# Patient Record
Sex: FEMALE | Race: WHITE
[De-identification: names, ages, dates, MRNs, and addresses within clinical notes are randomized per-mention and may not be internally consistent; named-entity substitution may affect disease eponyms.]

---

## 2017-01-13 ENCOUNTER — HOSPITAL ENCOUNTER (OUTPATIENT)
Dept: HOSPITAL 17 - CPRE | Age: 77
End: 2017-01-13
Attending: ORTHOPAEDIC SURGERY
Payer: MEDICARE

## 2017-01-13 DIAGNOSIS — M79.609: ICD-10-CM

## 2017-01-13 DIAGNOSIS — M17.11: ICD-10-CM

## 2017-01-13 DIAGNOSIS — Z01.812: Primary | ICD-10-CM

## 2017-01-13 DIAGNOSIS — R82.99: ICD-10-CM

## 2017-01-13 DIAGNOSIS — B96.20: ICD-10-CM

## 2017-01-13 LAB
ANION GAP SERPL CALC-SCNC: 10 MEQ/L (ref 5–15)
APTT BLD: 24.5 SEC (ref 24.3–30.1)
BACTERIA #/AREA URNS HPF: (no result) /HPF
BUN SERPL-MCNC: 14 MG/DL (ref 7–18)
CHLORIDE SERPL-SCNC: 103 MEQ/L (ref 98–107)
COLOR UR: YELLOW
COMMENT (UR): (no result)
CULTURE IF INDICATED: (no result)
ERYTHROCYTE [DISTWIDTH] IN BLOOD BY AUTOMATED COUNT: 12.7 % (ref 11.6–17.2)
GFR SERPLBLD BASED ON 1.73 SQ M-ARVRAT: 71 ML/MIN (ref 89–?)
GLUCOSE UR STRIP-MCNC: (no result) MG/DL
HCO3 BLD-SCNC: 29.3 MEQ/L (ref 21–32)
HCT VFR BLD CALC: 45.1 % (ref 35–46)
HGB UR QL STRIP: (no result)
INR PPP: 1 RATIO
KETONES UR STRIP-MCNC: (no result) MG/DL
MCH RBC QN AUTO: 33 PG (ref 27–34)
MCHC RBC AUTO-ENTMCNC: 34.5 % (ref 32–36)
MCV RBC AUTO: 95.7 FL (ref 80–100)
MUCOUS THREADS #/AREA URNS LPF: (no result) /LPF
NITRITE UR QL STRIP: (no result)
PLATELET # BLD: 146 TH/MM3 (ref 150–450)
POTASSIUM SERPL-SCNC: 4.5 MEQ/L (ref 3.5–5.1)
PROTHROMBIN TIME: 11 SEC (ref 9.8–11.6)
RBC # BLD AUTO: 4.71 MIL/MM3 (ref 4–5.3)
REVIEW FLAG: (no result)
SODIUM SERPL-SCNC: 142 MEQ/L (ref 136–145)
SP GR UR STRIP: 1.02 (ref 1–1.03)
SQUAMOUS #/AREA URNS HPF: 1 /HPF (ref 0–5)
WBC # BLD AUTO: 7.1 TH/MM3 (ref 4–11)

## 2017-01-13 PROCEDURE — 87086 URINE CULTURE/COLONY COUNT: CPT

## 2017-01-13 PROCEDURE — 87077 CULTURE AEROBIC IDENTIFY: CPT

## 2017-01-13 PROCEDURE — 80048 BASIC METABOLIC PNL TOTAL CA: CPT

## 2017-01-13 PROCEDURE — 36415 COLL VENOUS BLD VENIPUNCTURE: CPT

## 2017-01-13 PROCEDURE — 85610 PROTHROMBIN TIME: CPT

## 2017-01-13 PROCEDURE — 81001 URINALYSIS AUTO W/SCOPE: CPT

## 2017-01-13 PROCEDURE — 87186 SC STD MICRODIL/AGAR DIL: CPT

## 2017-01-13 PROCEDURE — 85730 THROMBOPLASTIN TIME PARTIAL: CPT

## 2017-01-13 PROCEDURE — 85027 COMPLETE CBC AUTOMATED: CPT

## 2017-02-07 NOTE — RADRPT
EXAM DATE/TIME:  02/07/2017 13:09 

 

[facility] COMPARISON:     

No previous studies available for comparison.

 

                     

INDICATIONS :     

Post op right knee surgery.

                     

 

MEDICAL HISTORY :     

None.          

 

SURGICAL HISTORY :     

None.   

 

ENCOUNTER:     

Initial                                        

 

ACUITY:     

1 day      

 

PAIN SCORE:     

0/10

 

LOCATION:     

Right  Knee.

 

FINDINGS:     

AP and lateral views of the right knee were obtained and demonstrate the patient is status post arthr
oplasty. The femoral and tibial components are intact and in normal alignment. There are postoperativ
e changes involving the patella. A surgical drain is noted in place.

 

CONCLUSION:     Expected post operative changes status post arthroplasty. 

 

 

 Noam Matias MD on February 07, 2017 at 14:35           

Board Certified Radiologist.

 This report was verified electronically.

## 2017-02-07 NOTE — MB
cc:

BAYLEE CUBA MD

****

 

 

DATE OF CONSULTATION

1/7/17

 

DATE OF BIRTH

1940

 

ADMITTING PHYSICIAN

Dr. Ruiz

 

REASON FOR CONSULTATION

Medical management postoperatively.

 

HISTORY OF PRESENT ILLNESS

The patient is a very pleasant 77-year  female with past medical

history of gastroesophageal reflux disease.  The patient is only taking Nexium

at home.  Also, the patient has arthritis for which she is taking care of by

her primary care doctor and orthopedic as an outpatient.  She failed outpatient

therapy.  She was advised to have a total knee arthroplasty.  The patient was

admitted today and had a total knee arthroplasty.  The patient is seen in the

PACU.  At present, in the PACU the patient has no complaint.  She denies any

headache, dizziness, nausea, vomiting, chest pain, diaphoresis or palpitations.

She has no pain, just slight ache at the surgical site.  Her sensation is

back.

 

PAST MEDICAL HISTORY

Gastroesophageal reflux disease.

 

MEDICATIONS

Nexium.

 

ALLERGIES

IODINE

KEFLEX

 

REVIEW OF SYSTEMS

Negative for 10 systems.

 

SOCIAL HISTORY

The patient denies smoking, drinking or doing any  drugs.

 

FAMILY HISTORY

Noncontributory.

 

PHYSICAL EXAMINATION

GENERAL:  The patient is  alert  and oriented lying on bed without any apparent

distress.

VITAL SIGNS:  The patient is afebrile, pulse  is 68, respiratory rate  16,

blood pressure  145/85, pulse of 98% on 2 liters.  HEENT:  Head is

normocephalic.  Negative conjunctival icterus mouth unremarkable.

NECK:  Supple.  Negative increased JVD, negative thyromegaly.  Central trachea.

 

RESPIRATORY:   Clear to auscultation.

CARDIOVASCULAR:  S1, [id number], negative S3 gallop.

GI:  Abdomen soft, no organomegaly.  Positive bowel sounds.  MUSCULOSKELETAL:

Extremities no cyanosis or pedal edema appreciated.

CNS:  Alert and oriented.  Normal facial features, moving bilateral toes.

Moving upper extremities.

 

LABORATORY DATA

No new labs were reviewed.

 

IMAGING STUDIES

X-ray of the knee shows expected postoperative changes status post

arthroplasty.

 

ASSESSMENT

1.  Right total knee arthroplasty due to severe arthritis.

2.  Gastroesophageal reflux disease.

 

RECOMMENDATIONS

1.  Postop antibiotic, analgesics, anticoagulant and PT as per Ortho.

2.  We will continue PPI for her gastroesophageal reflux disease.

3.  Monitor blood pressure.

4.  CBC, BMP in the morning.

5.  Discussed with the patient.

 

Thank you Dr. Ruiz for the consult.  We will follow with you.

 

 

 

                              _________________________________

                              MD NADIRA Gonzales

D:  2/7/2017/4:19 PM

T:  2/7/2017/5:03 PM

Visit #:  [id number]

Job #:  [id number]

## 2017-02-07 NOTE — MP
cc:

PETER ROLAND.

****

 

 

DATE OF SURGERY: 2/7/2017

 

PREOPERATIVE DIAGNOSIS:

Primary osteoarthritis right knee.

 

POSTOPERATIVE DIAGNOSIS

Primary osteoarthritis right knee.

 

OPERATION PERFORMED

Right total knee arthroplasty with Paramjit Triathlon

Prosthesis (uncemented).

 

SURGEON

NARCISO Roland MD

 

ANESTHESIA

Spinal with supplemental adductor canal block and local.

 

INDICATIONS AND FINDINGS

This 77 year-old woman has had right knee pain for the past 18 months.  She did

not respond to arthroscopic surgery and progressively worsened, in spite of the

use of nonsteroidal anti-inflammatories, activity modification, exercise,

intra-articular corticosteroids and ambulatory aids.  The ability to walk was

limited.  She has difficulty ascending and descending stairs and getting out of

a chair.  She has difficulty going to her condo up and down the stairs.

 

PHYSICAL FINDINGS

Physical findings show some genu varum with medial laxity and crepitation on

motion.  There is tenderness in the medial compartment.  She has effusion.

 

RADIOGRAPHIC FINDINGS

Radiographic findings including an x-ray and MRI showed significant arthritis

in the knee with loss of articular cartilage to bone-on-bone on the PA flexion

view and the medial compartments and irregularity in the medial  joint and some

lateral and patellofemoral.

 

OPERATIVE FINDINGS

Operative findings showed significant arthritis in the medial compartment with

exposed subchondral bone on femur and tibia.  In addition there was

irregularity in the lateral compartment and the patellofemoral compartment.

 

The prosthesis used was a Paramjit Triathlon prosthesis.  The femoral component

was a size 3 right cruciate-retaining cementless.  The tibia was a Tritanium

baseplate size 4 with a an 11-mm spacer of X3 polyethylene also

cruciate-retaining.  The patella was a Tritanium backed patella with a size 32

asymmetric.

PROCEDURE

The patient was brought to the operating room and general anesthetic was

administered.  The patient had an adductor canal block preoperatively.  She was

brought to the operating room and had a general anesthetic administered.  She

was placed in a supine position on the operating table with a small bolster

under the right hip.  A pneumatic tourniquet was applied to the right thigh.

The limb was then prepped with alcohol, Hibiclens and Chloraprep, and draped in

the usual manner with the knee draped free.  An appropriate time-out procedure

was carried out.  The wound site was injected after marking with Exparel.  An

anterior incision was then made from about three fingerbreadths above the

superior medial pole of the patella down to the tibial tubercle on the medial

side.  The incision was deepened through the subcutaneous tissues to the

retinacular structures which were exposed medially and laterally.  The medial

retinacular incision was made from the superior medial pole of the patella down

to the tibial tubercle and up into the quadriceps tendon splitting it

longitudinally in the medial one-third.  The patella was reflected.  Medial and

lateral dissection was carried out.  The infrapatellar fat pad was debulked.

The posterior surface of patella was excised with the oscillating saw taking

care to prevent injury to associated structures.  After the patellar protector

was placed, attention was directed the femur.  Oconto's Line was marked.  A

fenestration was made in the distal end of the femur and proximal end of the

tibia for intramedullary referencing guides.  The distal femoral cutting guide

and jig were assembled for 5 degree 8-mm cut.  Cutting block was stabilized

with pins.  The jig was removed.  The distal femoral cut was completed with the

oscillating saw.  A sizing guide was positioned along Oconto's Line and the

epicondylar axis.

 

The size of the femur was determined to be a size 3.  The four-in-one cutting

block was then positioned in place.  Anterior and posterior cuts were made

followed by posterior and anterior chamfer cuts.  Osteophytes were trimmed.

Medial and lateral meniscectomies were completed.  The posterior osteophytes

were trimmed.  The tibia resection guide was positioned in place with the

intramedullary referencing guide.  The alignment was checked and the guide

stabilized with pins.  The depth of cut was then verified with the stylus.  The

cutting block was stabilized with pins.  The jig was removed.  The spacer block

was used to determine the depth of cut.  The proximal tibial cutting guide was

adjusted and stabilized.  The proximal tibial cut was completed with the

oscillating saw.

 

After checking again with the sizing block, it was determined that the

prosthesis would be an 11 mm one.  The tibial baseplate size 4 with an 11 mm

spacer was inserted.  This appeared to be appropriate.  Femoral component was

impacted into place.  The patella drill guide was positioned in place and

patella drill holes made.  The 32 millimeter patella was chosen.  The knee was

taken through a range of motion which was easily 0 degrees extension to 145

degrees of flexion with excellent stability and flexion and extension.

 

The femoral drill holes were made.  The femoral and patella trial components

were removed.  The tibial spacer trial was removed after using the tibial punch

through its guide.  The tibial baseplate trial was removed.  The tibial drill

guide was positioned in place.  Drill holes were made.

 

Exparel was then used throughout the knee, especially in the posterior capsule.

After cleaning the cut ends of bone with pulse lavage, the tibial baseplate was

impacted into place and seated appropriately.  An 11 millimeter spacer was

inserted.  The femoral component was then impacted into place and seated

appropriately.  The patella was then positioned in place and seated

appropriately with the patella device.

 

Drains were brought out the superolateral aspect of the suprapatellar pouch.

Hemostasis was achieved with electrocautery.  Wound closure then commenced

using 0 Vicryl interrupted figure-of-eight sutures for the retinacular and

capsular structures, 2-0 Vicryl interrupted simple sutures with buried knots

for the subcutaneous tissues and 4-0 Monocryl continuous subcuticular closure

for the skin.  The wound was dressed with Steri-Strips followed by dry

dressing, sterile Sof-Rol, cooling pad further sterile Sof-Rol and Ace bandage

from the base of the toe to midthigh.  The patient was transferred from the

operating room to the recovery room in satisfactory condition having tolerated

the procedure well.

 

 

 

 

 

Counts were correct.  Specimens none.  Estimated blood loss 100 mL.

 

 

                              _________________________________

                              MD PETER Wayne/SUZETTE

D:  2/7/2017/12:25 PM

T:  2/7/2017/10:18 PM

Visit #:  [id number]

Job #:  [id number]

## 2017-02-08 NOTE — PD.ORT.PN
Subjective


Post Op Day #:  1


Subjective Remarks


She is doing well. 


There is little pain.


She did not dangle or have PT.





Objective


Vitals





 Vital Signs








  Date Time  Temp Pulse Resp B/P Pulse Ox O2 Delivery O2 Flow Rate FiO2


 


2/8/17 04:31 98.4 69 18 124/68 96   


 


2/8/17 00:24 97.6 78 18 122/75 96   


 


2/7/17 20:52        21


 


2/7/17 17:45 96.0 94 18 124/66 96   


 


2/7/17 17:00  94 16 118/63 95 Room Air  


 


2/7/17 16:15   16  98   


 


2/7/17 16:00  62 16 128/84 97 Nasal Cannula 2 


 


2/7/17 15:00  64 16 123/78 97 Nasal Cannula 2 


 


2/7/17 14:00  68 16 145/85 98 Nasal Cannula 2 


 


2/7/17 13:45  62 16 137/81 97 Nasal Cannula 2 


 


2/7/17 13:30  66 16 146/82 98 Nasal Cannula 2 


 


2/7/17 13:15  62 16 143/77 97 Nasal Cannula 2 


 


2/7/17 13:00  64 16 126/72 95 Nasal Cannula 2 


 


2/7/17 12:45  72 16 118/71 95 Nasal Cannula 2 


 


2/7/17 12:34 97.7 86 16 115/74 93 Nasal Cannula 2 


 


2/7/17 07:28 97.9 98 20 176/91 96   








 I/O








 2/7/17 2/7/17 2/7/17 2/8/17 2/8/17 2/8/17





 07:00 15:00 23:00 07:00 15:00 23:00


 


Intake Total  50 ml 500 ml 600 ml  


 


Output Total  60 ml 1040 ml 50 ml  


 


Balance  -10 ml -540 ml 550 ml  


 


      


 


Intake Oral   500 ml 600 ml  


 


IV Total  50 ml    


 


Output Urine Total   900 ml   


 


Drainage Total  60 ml 140 ml 50 ml  


 


# Voids    5  


 


# Bowel Movements   0 0  








Imaging





Last 24 hours Impressions








Knee X-Ray 2/7/17 1258 Signed





Impressions: 





 Service Date/Time:  Tuesday, February 7, 2017 13:09 - CONCLUSION: Expected 

post 





 operative changes status post arthroplasty.     Noam Matias MD 








Objective Remarks


She is resting comfortably, supine in bed in the [facility].


The neurovascular status is intact.


The original dressing is dry and intact.





Assessment & Plan


Ortho Post Op Day #:  1


Problem List:  


(1) Status post total right knee replacement


Plan:  Continue postop care. Start PT.





Assessment and Plan


Condition: Good.


Orthopaedically stable.


DVT prophylaxis: DOMO stockings, sequentials, early mobilization, ASA.


Discharge plans: Home with [facility], probably tomorrow.


Has appointment.








PETER Ruiz MD (Charles) Feb 8, 2017 06:44

## 2017-02-08 NOTE — HHI.PR
Subjective


Remarks


No chest pain no ,


shortness of breath


Cough, states seasonal


No headache


Appetite good


Afebrile





Objective


Objective Results


-





 Vital Signs








  Date Time  Temp Pulse Resp B/P Pulse Ox O2 Delivery O2 Flow Rate FiO2


 


2/8/17 09:05     93   21


 


2/8/17 04:31 98.4 69 18 124/68 96   


 


2/8/17 00:24 97.6 78 18 122/75 96   


 


2/7/17 20:52        21


 


2/7/17 17:45 96.0 94 18 124/66 96   


 


2/7/17 17:00  94 16 118/63 95 Room Air  


 


2/7/17 16:15   16  98   


 


2/7/17 16:00  62 16 128/84 97 Nasal Cannula 2 


 


2/7/17 15:00  64 16 123/78 97 Nasal Cannula 2 


 


2/7/17 14:00  68 16 145/85 98 Nasal Cannula 2 








 I/O








 2/7/17 2/7/17 2/7/17 2/8/17 2/8/17 2/8/17





 07:00 15:00 23:00 07:00 15:00 23:00


 


Intake Total  50 ml 500 ml 600 ml  


 


Output Total  60 ml 1040 ml 50 ml  


 


Balance  -10 ml -540 ml 550 ml  


 


      


 


Intake Oral   500 ml 600 ml  


 


IV Total  50 ml    


 


Output Urine Total   900 ml   


 


Drainage Total  60 ml 140 ml 50 ml  


 


# Voids    5  


 


# Bowel Movements   0 0  








Result Diagram:  


2/8/17 0732                                                                    

            2/8/17 0732








ROS


General:  Weakness (generalized), Other (10 point ROS complete, mild 

generalized weakness postop otherwise negative exam)





Physical Exam


Physical Exam





PHYSICAL EXAMINATION


GENERAL:  This is a well-developed, well-nourished   female 


who appears to be in no acute distress.  She  is alert and awake, responds to 

verbal stimuli.  


HEAD:  Normocephalic without any lesion or mass noted.  Facial features


appear symmetric.


OROPHARYNGEAL:  Oropharynx without erythema or edema.


NECK:  Supple.  No nuchal rigidity or lymphadenopathy.


Trachea midline without deviation.  


CARDIAC:  Regular rhythm, regular rate, S1 and S2 are heard.  Murmur none; no 

gallops or rubs.


LUNGS:  Clear to auscultation bilaterally.  No wheeze, no rhonchi or no rale.  

No


use of accessory muscles on inspiration or expiration.


ABDOMEN:  Soft, nontender, no organomegaly or masses.  Bowel sounds are


heard in all four quadrants.  No rebound.  No guarding.


EXTREMITIES:  Trace edema right leg.  Wrapped securely with cooling apparatus 

on.  Drainage intimal dark red in container.  Pulses equal bilateral.  No 

cyanosis.


NEUROLOGICAL:  Patient mood and affect appropriate. No focal deficit


SKIN:Warm and moist, dry


Objective Remarks


I Am doing okay I guess. Got up and walked X 1 today.





A/P


Assessment and Plan





1.  Right total knee arthroplasty due to severe arthritis.


2.  Gastroesophageal reflux disease.


3.  Hyperglycemia mild, no known diabetes


 


RECOMMENDATIONS


 Postop antibiotic, analgesics, anticoagulant and PT as per Ortho.


 We will continue PPI for her gastroesophageal reflux disease.


 Monitor blood pressure normal limits, afebrile, rate 60s to 90s, respirations 

18.


 Discussed with the patient medical plan of care and any concerns


Monitor nutrition


Monitor blood sugar, and or any symptoms of hyperglycemia


Discharge Planning


rehab


Discussed With:  Nurse, Family (patient), Other (Dr. Johnson, patient seen on his 

behalf)








Jamila Arzate Feb 8, 2017 13:58





Jamila Arzate Feb 8, 2017 13:58

## 2017-02-09 NOTE — PD.ORT.PN
Subjective


Post Op Day #:  2


Subjective Remarks


She is doing well. 


There is a little more pain than yesterday.


She did well with PT.





She has 13 steps to ascend/descend to access her home.


She lives alone.


Range of Motion


0 to 90 degrees.


Distance Walked


100 feet, then 150 feet.





Objective


Vitals





 Vital Signs








  Date Time  Temp Pulse Resp B/P Pulse Ox O2 Delivery O2 Flow Rate FiO2


 


2/9/17 04:00 97.2 81 20 137/83 95   


 


2/9/17 00:00 97.5 81 20 130/77 93   


 


2/8/17 20:40 97.5 80 15 118/61 95   


 


2/8/17 19:39 97.5 81 13 118/61 95   


 


2/8/17 18:35        21


 


2/8/17 15:45 96.1 75 13 120/63 98   


 


2/8/17 12:00 95.8 87 16 128/71 97   


 


2/8/17 09:05     93   21


 


2/8/17 08:00 95.6 84 16 148/71 95   








 I/O








 2/8/17 2/8/17 2/8/17 2/9/17 2/9/17 2/9/17





 07:00 15:00 23:00 07:00 15:00 23:00


 


Intake Total 600 ml 450 ml 240 ml 220 ml  


 


Output Total 50 ml  220 ml   


 


Balance 550 ml 450 ml 20 ml 220 ml  


 


      


 


Intake Oral 600 ml  240 ml 220 ml  


 


IV Total  450 ml    


 


Drainage Total 50 ml  220 ml   


 


# Voids 5  1 2  


 


# Bowel Movements 0  0 0  





220 drainage recorded was 170+50ml.


Result Diagram:  


2/8/17 0732                                                                    

            2/8/17 0732





Imaging





Last 24 hours Impressions








Knee X-Ray 2/7/17 1258 Signed





Impressions: 





 Service Date/Time:  Tuesday, February 7, 2017 13:09 - CONCLUSION: Expected 

post 





 operative changes status post arthroplasty.     Noam Matias MD 








Objective Remarks


She is resting comfortably, supine in bed in the [facility].


The neurovascular status is intact.


The original dressing is dry and intact.





Assessment & Plan


Ortho Post Op Day #:  2


Problem List:  


(1) Status post total right knee replacement


Plan:  Continue postop care and PT.





Assessment and Plan


Condition: Good.


Orthopaedically stable.


DVT prophylaxis: DOMO stockings, sequentials, early mobilization, Xarelto.


Discharge plans: SNF for rehab, probably tomorrow.


Has appointment.


Rx: Norco 7.5/325.








PETER Ruiz MD ([place]) Feb 9, 2017 06:49

## 2017-02-09 NOTE — HHI.FF
Face to Face Verification


Diagnosis:  


(1) Status post total right knee replacement


Physical Therapy


Gait training


Knee:  Total knee, Protocol: Right, Gait training, Full weight bearing


Right LE Weight Bearing:  WB as tolerated


Right LE Range of Motion:  Active ROM (AROM, AAROM, PROM, PRE. ROM goal is 0 to 

135 degrees. ROM in the OR was 0 to 145 degrees.)





Nursing


Nursing:  Dressing changes


Dressing Changes:  Daily dressing change, Coverderm/Primapore


Additional Instructions


Remove steristrips on postop day 14.








I have seen patient Vicky Martínez on 2/9/17. My clinical findings support 

the need for the requested home health care services because:


Ltd mobility - disease progression


Limited ability to care for self


High risk of falls








I certify that my clinical findings support that this patient is homebound 

because:


Post-op weakness


Unsteady gait/balance


Unsafe to leave home unassisted








PETER Ruiz MD (Charles) Feb 9, 2017 08:08

## 2017-02-09 NOTE — HHI.PR
Subjective


Subjective Remarks


Ambulating today


No chest pain


Shortness of breath


MOM given for bowel regimen


Mild to moderate pain





Review of Systems


Constitutional


Constitutional:  Weakness (mild postop)


Constitutional Remarks


10 point ROS done.  Positives noted some mild generalized weakness





Musculoskeletal


MS:  Stiffness


MS Remarks


Right total knee, dressing clean, to be changed today





Integumentary


Skin:  Wounds (Clean, dressing dry)





Psychiatric


Psychiatric:  Normal Mood





Vitals/Results


Intake & Output











 2/8/17 2/8/17 2/9/17





 15:00 23:00 07:00


 


Intake Total 450 ml 240 ml 220 ml


 


Output Total  220 ml 


 


Balance 450 ml 20 ml 220 ml


 


   


 


Intake Oral  240 ml 220 ml


 


IV Total 450 ml  


 


Drainage Total  220 ml 


 


# Voids  1 2


 


# Bowel Movements  0 0








Vital Signs





 Vital Signs








  Date Time  Temp Pulse Resp B/P Pulse Ox O2 Delivery O2 Flow Rate FiO2


 


2/9/17 11:40 95.4 77 16 97/54 94   


 


2/9/17 08:00 95.6 72 17 138/75 95   


 


2/9/17 04:00 97.2 81 20 137/83 95   


 


2/9/17 00:00 97.5 81 20 130/77 93   


 


2/8/17 20:40 97.5 80 15 118/61 95   


 


2/8/17 19:39 97.5 81 13 118/61 95   


 


2/8/17 18:35        21


 


2/8/17 15:45 96.1 75 13 120/63 98   








CBC/BMP:  


2/9/17 0600                                                                    

            2/8/17 0732





Lab Results





Laboratory Tests








Test 2/9/17





 06:00


 


Hemoglobin 12.0 GM/DL


 


Hematocrit 35.6 %








Imaging Remarks





Last Impressions








Knee X-Ray 2/7/17 1258 Signed





Impressions: 





 Service Date/Time:  Tuesday, February 7, 2017 13:09 - CONCLUSION: Expected 

post 





 operative changes status post arthroplasty.     Noam Matias MD 








Current Medications





 Active Medications


Docusate Sodium (Colace) 100 mg BID PO;  Start 2/8/17 at 21:00





Physical Exam


General


General Appearance:  Well Developed, Well Nourished, No Acute Distress





Eyes


Eye Exam:  Pupils Equal, Pupils Reactive





Ears & Nose


Ears & Nose Exam:  Nasal Mucosa Pink





Throat


Throat Exam:  Oral Mucosa Pink & Moist





Neck


Neck Exam:  Neck Supple, Trachea Midline





Pulmonary


Resp Exam:  Clear Bilaterally, Breath Sounds Equal, No Distress





Cardiology


CV Exam:  Regular





Gastrointestinal/Abdomen


GI Exam:  Bowel Sounds Present


GI Remarks


No BM in 2 days.  MOM taken today





Genitourinary


 Exam:  Clear Urine





Musculoskeletal


MS Exam:  Normal Tone, Good Strength


MS Remarks


Right right knee total, postop day 2





Integumentary


Skin Exam:  Clear, Warm, Dry, Normal Turgor


Skin Remarks


Clean incision line right knee





Extremeties


Extremities Exam:  No Edema, Trace Edema





Neurologic


Neuro Exam:  Alert, Awake, Oriented, Speech Clear, Moving All Extremities





VTE Prophylaxis


VTE Prophylaxis Device:  SCDs





Assessment/Plan


Assessment/Plan


Assessment and Plan





1.  Right total knee arthroplasty due to severe arthritis.


2.  Gastroesophageal reflux disease.


3.  Hyperglycemia mild, no known diabetes


4.  Debility mild, 


5.  Constipation mild





 


RECOMMENDATIONS


 Postop antibiotic, analgesics, anticoagulant and PT as per Ortho.


 We will continue PPI for her gastroesophageal reflux disease.


 Monitor blood pressure normal limits, afebrile, rate 60s to 90s, respirations 

18.


 Discussed with the patient medical plan of care and any concerns


Monitor nutrition, appetite good


Monitor blood sugar, and or any symptoms of hyperglycemia


BMP in a.m.


Monitor bowel regimen.  Took MOM today.  Denies any acute issues for now


Plan to go to rehabilitation after 3 nights stay.


Discharge Planning


rehab


Discussed With:  Nurse, Family (patient), Other (Dr. Johnson, patient seen on his 

behalf)


Discussed Condition with:  Patient








Jamila ArzateLyssa CMGEE Feb 9, 2017 13:22

## 2017-02-10 NOTE — HHI.PR
Subjective


Interval History


Patient is feeling better


Some ache at surgical site


Did walk today


Review of system for 10 point system otherwise unremarkable





Review of Systems


Constitutional


Constitutional:  Weakness (mild postop)





Musculoskeletal


MS:  Stiffness





Integumentary


Skin:  Wounds (Clean, dressing dry)





Psychiatric


Psychiatric:  Normal Mood





Vitals/Results


Intake & Output











 2/9/17 2/9/17 2/10/17





 15:00 23:00 07:00


 


Intake Total 720 ml 240 ml 480 ml


 


Output Total 70 ml  


 


Balance 650 ml 240 ml 480 ml


 


   


 


Intake Oral 720 ml 240 ml 480 ml


 


Drainage Total 70 ml  


 


# Voids 3 2 3


 


# Bowel Movements 1 2 0








Vital Signs





 Vital Signs








  Date Time  Temp Pulse Resp B/P Pulse Ox O2 Delivery O2 Flow Rate FiO2


 


2/10/17 08:12 98.6 80 16 138/67 94   


 


2/10/17 00:00 99.4 86 16 121/62 94   


 


2/9/17 21:08        21


 


2/9/17 20:30 98.6 84 16 109/54 95   


 


2/9/17 19:05  89 20 101/54 97   


 


2/9/17 16:05 97.5 82 17 101/76 96   


 


2/9/17 11:40 95.4 77 16 97/54 94   








CBC/BMP:  


2/9/17 0600                                                                    

            2/10/17 0600





Lab Results





Laboratory Tests








Test 2/10/17





 06:00


 


Sodium Level 141 MEQ/L


 


Potassium Level 3.5 MEQ/L


 


Chloride Level 104 MEQ/L


 


Carbon Dioxide Level 27.0 MEQ/L


 


Anion Gap 10 MEQ/L


 


Blood Urea Nitrogen 9 MG/DL


 


Creatinine 0.53 MG/DL


 


Estimat Glomerular Filtration 112 ML/MIN





Rate 


 


Random Glucose 102 MG/DL


 


Calcium Level 8.5 MG/DL











Physical Exam


General


General Appearance:  Well Developed, Well Nourished, No Acute Distress





Eyes


Eye Exam:  Pupils Equal, Pupils Reactive





Ears & Nose


Ears & Nose Exam:  Nasal Mucosa Pink





Throat


Throat Exam:  Oral Mucosa Pink & Moist





Neck


Neck Exam:  Neck Supple, Trachea Midline





Pulmonary


Resp Exam:  Clear Bilaterally, Breath Sounds Equal, No Distress





Cardiology


CV Exam:  Regular





Gastrointestinal/Abdomen


GI Exam:  Bowel Sounds Present





Genitourinary


 Exam:  Clear Urine





Musculoskeletal


MS Exam:  Normal Tone, Good Strength


MS Remarks


Dressing on surgical area





Integumentary


Skin Exam:  Clear, Warm, Dry, Normal Turgor





Extremeties


Extremities Exam:  No Edema, Trace Edema





Neurologic


Neuro Exam:  Alert, Awake, Oriented, Speech Clear, Moving All Extremities





VTE Prophylaxis


VTE Prophylaxis Device:  SCDs


VTE Remarks


Xarelto





Assessment/Plan


Assessment/Plan


Assessment and Plan





1.  Right total knee arthroplasty due to severe arthritis.


2.  Gastroesophageal reflux disease.


3.  Hyperglycemia mild, no known diabetes


4.  Debility mild, 


5.  Constipation mild





 


RECOMMENDATIONS


 Postop antibiotic, analgesics, anticoagulant and PT as per Ortho.


 continue PPI for her gastroesophageal reflux disease.


Stable blood pressure


Labs reviewed


 Discussed with the patient medical plan of care and any concerns


Monitor nutrition, appetite good


Monitor bowel regimen.  Denies any acute issues for now


Plan to go to rehabilitation hopefully today.











Carla Johnson MD Feb 10, 2017 11:02

## 2017-02-10 NOTE — PD.ORT.PN
Subjective


Post Op Day #:  3


Subjective Remarks


She is doing well. 


There is less pain than yesterday.


She has done well with PT.





She has 13 steps to ascend/descend to access her home.


She lives alone.


Range of Motion


-4 to 96 degrees.


Distance Walked


200 feet.





Objective


Vitals





 Vital Signs








  Date Time  Temp Pulse Resp B/P Pulse Ox O2 Delivery O2 Flow Rate FiO2


 


2/10/17 00:00 99.4 86 16 121/62 94   


 


2/9/17 21:08        21


 


2/9/17 20:30 98.6 84 16 109/54 95   


 


2/9/17 19:05  89 20 101/54 97   


 


2/9/17 16:05 97.5 82 17 101/76 96   


 


2/9/17 11:40 95.4 77 16 97/54 94   








 I/O








 2/9/17 2/9/17 2/9/17 2/10/17 2/10/17 2/10/17





 07:00 15:00 23:00 07:00 15:00 23:00


 


Intake Total 220 ml 720 ml 240 ml 480 ml  


 


Output Total 40 ml 70 ml    


 


Balance 180 ml 650 ml 240 ml 480 ml  


 


      


 


Intake Oral 220 ml 720 ml 240 ml 480 ml  


 


Drainage Total 40 ml 70 ml    


 


# Voids 2 3 2 3  


 


# Bowel Movements 0 1 2 0  








Result Diagram:  


2/9/17 0600                                                                    

            2/10/17 0600





Imaging





Last 24 hours Impressions








Knee X-Ray 2/7/17 1258 Signed





Impressions: 





 Service Date/Time:  Tuesday, February 7, 2017 13:09 - CONCLUSION: Expected 

post 





 operative changes status post arthroplasty.     Noam Matias MD 








Objective Remarks


She is resting comfortably, supine in bed in the C[facility].


The neurovascular status is intact.


The dressing is dry and intact.


There is no erythema or induration.





Assessment & Plan


Ortho Post Op Day #:  3


Problem List:  


(1) Status post total right knee replacement


Plan:  Continue postop care and PT.





Assessment and Plan


Condition: Good.


Orthopaedically stable.


DVT prophylaxis: DOMO stockings, sequentials, early mobilization, Xarelto.


Discharge plans: SNF for rehab today.


Has appointment.


Rx: Norco 7.5/325.








PETER Ruiz MD (Charles) Feb 10, 2017 08:45

## 2017-03-29 NOTE — MD
cc:

PETER ROLAND.

****

 

 

 

ADMISSION DATE: 2/7/2017     DISCHARGE DATE:  2/10/2017

 

ADMISSION DIAGNOSIS

Primary osteoarthritis right knee

 

FINAL DIAGNOSIS

Primary osteoarthritis right knee

 

OPERATION PERFORMED

Right total knee arthroplasty using a Paramjit Triathlon prosthesis

(uncemented).

 

PRESENT ILLNESS

This 77-year-old woman has had 18 months of right knee pain that did not

respond to conservative measures and arthroscopic surgery.  She has used

anti-inflammatory agents, activity modification, exercises, intra-articular

steroids and ambulatory aids.  She has difficulty ascending and descending

stairs and getting out of a chair.  She has had difficulty ambulating.

 

Physical findings showed genu varum with medial laxity and crepitation on

motion.  There is tenderness in the medial compartment.  She had an effusion.

 

 

Radiographic findings showed changes consistent with loss of articular

cartilage to bone-on-bone in the medial compartment with irregularity in the

joint line and patellofemoral and lateral findings as well.

 

HOSPITAL COURSE

The patient was admitted on 02/07/2017.  She had the above-noted operative

procedure carried out after having an abductor canal block preoperatively.  She

tolerated the procedure well.  In  post anesthesia, she was started on a

continuous passive motion device which was used throughout the procedure while

in bed.  She did not start therapy the day of surgery.  She remained afebrile.

There was very little pain.  On the day after surgery, she was started on

physical therapy.  DVT prophylaxis was initiated with DOMO stockings, sequential

and aspirin.

 

 

 

 

By the second postoperative day, she had been doing relatively well with

therapy.  She was able to walk 100 feet and then 150 feet.  Her range of motion

was 0 degrees extension to 90 degrees of flexion.  She did have difficulty With

home and consideration was for appropriate discharge plans since she had 13

steps to ascend and descend to access her home and lives alone.  She remained

afebrile.  Her hemoglobin was 13.1.  She continued to progress well.  DVT

prophylaxis was initiated with DOMO stockings and Xarelto.  Arrangements were

being made for discharge to a skilled nursing facility.

 

On the third postoperative day, she was continuing to make progress.  She

walked 200 feet.  Her range of motion was minus 4 degrees extension to 96

degrees of flexion.  She remained afebrile.  She was discharged to a skilled

nursing facility for rehabilitation on that date.  She will continue with

Xarelto as well as the Norco.  She has a follow-up appointment.

 

 

 

 

                              _________________________________

                              MD PETER Wayne/JONNA

D:  3/22/2017/6:29 PM

T:  3/29/2017/7:44 AM

Visit #:  [id number]

Job #:  [id number]

## 2018-02-20 ENCOUNTER — HOSPITAL ENCOUNTER (INPATIENT)
Dept: HOSPITAL 17 - PHED | Age: 78
LOS: 7 days | Discharge: HOME | DRG: 439 | End: 2018-02-27
Attending: HOSPITALIST | Admitting: HOSPITALIST
Payer: MEDICARE

## 2018-02-20 VITALS
TEMPERATURE: 97.4 F | HEART RATE: 89 BPM | OXYGEN SATURATION: 96 % | RESPIRATION RATE: 19 BRPM | DIASTOLIC BLOOD PRESSURE: 74 MMHG | SYSTOLIC BLOOD PRESSURE: 131 MMHG

## 2018-02-20 VITALS
DIASTOLIC BLOOD PRESSURE: 70 MMHG | OXYGEN SATURATION: 94 % | RESPIRATION RATE: 16 BRPM | HEART RATE: 92 BPM | SYSTOLIC BLOOD PRESSURE: 135 MMHG

## 2018-02-20 VITALS
HEART RATE: 88 BPM | OXYGEN SATURATION: 95 % | DIASTOLIC BLOOD PRESSURE: 74 MMHG | SYSTOLIC BLOOD PRESSURE: 161 MMHG | RESPIRATION RATE: 18 BRPM | TEMPERATURE: 96.6 F

## 2018-02-20 VITALS
OXYGEN SATURATION: 95 % | HEART RATE: 89 BPM | RESPIRATION RATE: 18 BRPM | DIASTOLIC BLOOD PRESSURE: 71 MMHG | SYSTOLIC BLOOD PRESSURE: 137 MMHG

## 2018-02-20 VITALS
RESPIRATION RATE: 16 BRPM | OXYGEN SATURATION: 97 % | DIASTOLIC BLOOD PRESSURE: 67 MMHG | SYSTOLIC BLOOD PRESSURE: 140 MMHG | HEART RATE: 82 BPM

## 2018-02-20 VITALS — BODY MASS INDEX: 28.48 KG/M2 | HEIGHT: 60 IN | WEIGHT: 145.06 LBS

## 2018-02-20 DIAGNOSIS — N83.202: ICD-10-CM

## 2018-02-20 DIAGNOSIS — G62.9: ICD-10-CM

## 2018-02-20 DIAGNOSIS — Z96.651: ICD-10-CM

## 2018-02-20 DIAGNOSIS — K76.0: ICD-10-CM

## 2018-02-20 DIAGNOSIS — Z88.1: ICD-10-CM

## 2018-02-20 DIAGNOSIS — N39.0: ICD-10-CM

## 2018-02-20 DIAGNOSIS — K86.0: ICD-10-CM

## 2018-02-20 DIAGNOSIS — E87.6: ICD-10-CM

## 2018-02-20 DIAGNOSIS — K21.9: ICD-10-CM

## 2018-02-20 DIAGNOSIS — B96.20: ICD-10-CM

## 2018-02-20 DIAGNOSIS — E87.5: ICD-10-CM

## 2018-02-20 DIAGNOSIS — Z72.89: ICD-10-CM

## 2018-02-20 DIAGNOSIS — M19.90: ICD-10-CM

## 2018-02-20 DIAGNOSIS — K85.20: ICD-10-CM

## 2018-02-20 DIAGNOSIS — K85.90: Primary | ICD-10-CM

## 2018-02-20 DIAGNOSIS — K70.10: ICD-10-CM

## 2018-02-20 LAB
ALBUMIN SERPL-MCNC: 3.4 GM/DL (ref 3.4–5)
ALP SERPL-CCNC: 199 U/L (ref 45–117)
ALT SERPL-CCNC: 90 U/L (ref 10–53)
AST SERPL-CCNC: 304 U/L (ref 15–37)
BASOPHILS # BLD AUTO: 0.1 TH/MM3 (ref 0–0.2)
BASOPHILS NFR BLD: 0.8 % (ref 0–2)
BILIRUB SERPL-MCNC: 2 MG/DL (ref 0.2–1)
BUN SERPL-MCNC: 7 MG/DL (ref 7–18)
CALCIUM SERPL-MCNC: 8.7 MG/DL (ref 8.5–10.1)
CHLORIDE SERPL-SCNC: 102 MEQ/L (ref 98–107)
CREAT SERPL-MCNC: 0.7 MG/DL (ref 0.5–1)
EOSINOPHIL # BLD: 0 TH/MM3 (ref 0–0.4)
EOSINOPHIL NFR BLD: 0.1 % (ref 0–4)
ERYTHROCYTE [DISTWIDTH] IN BLOOD BY AUTOMATED COUNT: 14.4 % (ref 11.6–17.2)
GFR SERPLBLD BASED ON 1.73 SQ M-ARVRAT: 81 ML/MIN (ref 89–?)
GLUCOSE SERPL-MCNC: 102 MG/DL (ref 74–106)
HCO3 BLD-SCNC: 11.5 MEQ/L (ref 21–32)
HCT VFR BLD CALC: 46.9 % (ref 35–46)
HGB BLD-MCNC: 15.1 GM/DL (ref 11.6–15.3)
INR PPP: 1.2 RATIO
LYMPHOCYTES # BLD AUTO: 0.9 TH/MM3 (ref 1–4.8)
LYMPHOCYTES NFR BLD AUTO: 13.1 % (ref 9–44)
MCH RBC QN AUTO: 33.1 PG (ref 27–34)
MCHC RBC AUTO-ENTMCNC: 32.2 % (ref 32–36)
MCV RBC AUTO: 102.6 FL (ref 80–100)
MONOCYTE #: 0.2 TH/MM3 (ref 0–0.9)
MONOCYTES NFR BLD: 2.4 % (ref 0–8)
NEUTROPHILS # BLD AUTO: 5.3 TH/MM3 (ref 1.8–7.7)
NEUTROPHILS NFR BLD AUTO: 83.6 % (ref 16–70)
PLATELET # BLD: 161 TH/MM3 (ref 150–450)
PMV BLD AUTO: 8.4 FL (ref 7–11)
PROT SERPL-MCNC: 8.5 GM/DL (ref 6.4–8.2)
PROTHROMBIN TIME: 11.8 SEC (ref 9.8–11.6)
RBC # BLD AUTO: 4.57 MIL/MM3 (ref 4–5.3)
SODIUM SERPL-SCNC: 134 MEQ/L (ref 136–145)
WBC # BLD AUTO: 6.5 TH/MM3 (ref 4–11)

## 2018-02-20 PROCEDURE — 85025 COMPLETE CBC W/AUTO DIFF WBC: CPT

## 2018-02-20 PROCEDURE — 80061 LIPID PANEL: CPT

## 2018-02-20 PROCEDURE — C9113 INJ PANTOPRAZOLE SODIUM, VIA: HCPCS

## 2018-02-20 PROCEDURE — 83520 IMMUNOASSAY QUANT NOS NONAB: CPT

## 2018-02-20 PROCEDURE — 74181 MRI ABDOMEN W/O CONTRAST: CPT

## 2018-02-20 PROCEDURE — 76937 US GUIDE VASCULAR ACCESS: CPT

## 2018-02-20 PROCEDURE — 83735 ASSAY OF MAGNESIUM: CPT

## 2018-02-20 PROCEDURE — 80307 DRUG TEST PRSMV CHEM ANLYZR: CPT

## 2018-02-20 PROCEDURE — 80048 BASIC METABOLIC PNL TOTAL CA: CPT

## 2018-02-20 PROCEDURE — 81001 URINALYSIS AUTO W/SCOPE: CPT

## 2018-02-20 PROCEDURE — 74176 CT ABD & PELVIS W/O CONTRAST: CPT

## 2018-02-20 PROCEDURE — 87186 SC STD MICRODIL/AGAR DIL: CPT

## 2018-02-20 PROCEDURE — 78226 HEPATOBILIARY SYSTEM IMAGING: CPT

## 2018-02-20 PROCEDURE — 76377 3D RENDER W/INTRP POSTPROCES: CPT

## 2018-02-20 PROCEDURE — 84132 ASSAY OF SERUM POTASSIUM: CPT

## 2018-02-20 PROCEDURE — A9537 TC99M MEBROFENIN: HCPCS

## 2018-02-20 PROCEDURE — 85730 THROMBOPLASTIN TIME PARTIAL: CPT

## 2018-02-20 PROCEDURE — 80074 ACUTE HEPATITIS PANEL: CPT

## 2018-02-20 PROCEDURE — 93005 ELECTROCARDIOGRAM TRACING: CPT

## 2018-02-20 PROCEDURE — 80076 HEPATIC FUNCTION PANEL: CPT

## 2018-02-20 PROCEDURE — 82787 IGG 1 2 3 OR 4 EACH: CPT

## 2018-02-20 PROCEDURE — 80053 COMPREHEN METABOLIC PANEL: CPT

## 2018-02-20 PROCEDURE — 86255 FLUORESCENT ANTIBODY SCREEN: CPT

## 2018-02-20 PROCEDURE — 83690 ASSAY OF LIPASE: CPT

## 2018-02-20 PROCEDURE — 86301 IMMUNOASSAY TUMOR CA 19-9: CPT

## 2018-02-20 PROCEDURE — 82784 ASSAY IGA/IGD/IGG/IGM EACH: CPT

## 2018-02-20 PROCEDURE — 96374 THER/PROPH/DIAG INJ IV PUSH: CPT

## 2018-02-20 PROCEDURE — 87077 CULTURE AEROBIC IDENTIFY: CPT

## 2018-02-20 PROCEDURE — 87086 URINE CULTURE/COLONY COUNT: CPT

## 2018-02-20 PROCEDURE — 85610 PROTHROMBIN TIME: CPT

## 2018-02-20 RX ADMIN — PANTOPRAZOLE SCH MG: 40 TABLET, DELAYED RELEASE ORAL at 20:00

## 2018-02-20 RX ADMIN — ENOXAPARIN SODIUM SCH MG: 40 INJECTION SUBCUTANEOUS at 20:01

## 2018-02-20 RX ADMIN — Medication SCH ML: at 22:39

## 2018-02-20 NOTE — RADRPT
EXAM DATE/TIME:  2018 17:07 

 

[facility] COMPARISON:     

No previous studies available for comparison.

 

 

INDICATIONS :     

Vomiting, and left sided abdominal pain.

                  

 

ORAL CONTRAST:      

No oral contrast ingested.

                  

 

RADIATION DOSE:     

12.84 CTDIvol (mGy) 

 

 

MEDICAL HISTORY :     

Gastroesophageal reflux disease.  Hiatal hernia. Skin cancer.

 

SURGICAL HISTORY :      

Cholecystectomy. Hysterectomy. section.

 

ENCOUNTER:      

Initial

 

ACUITY:      

3 days

 

PAIN SCALE:      

0/10

 

LOCATION:       

Left pelvis abdomen

 

TECHNIQUE:     

Volumetric scanning of the abdomen and pelvis was performed.  Using automated exposure control and ad
justment of the mA and/or kV according to patient size, radiation dose was kept as low as reasonably 
achievable to obtain optimal diagnostic quality images.  DICOM format image data is available electro
nically for review and comparison.  

 

FINDINGS:     

 

LOWER LUNGS:     

The visualized lower lungs are clear.  Moderate-sized hiatus hernia containing the fundus of the stom
ach.

 

LIVER:     

Diffuse fatty change throughout the liver.  No biliary ductal dilatation.  Hemoclips in the tracy fro
m prior cholecystectomy.

 

SPLEEN:     

Normal size without lesion.

 

PANCREAS:     

There is induration of the fat about the pancreas and in the anterior pararenal space.  No focal flui
d collections seen.  No focal cystic areas in the pancreas.  The pancreatic duct is not identified.  
There is poor delineation of the margins of the head and distal body stop

 

KIDNEYS:     

Normal in size and shape.  There is no mass, stone, or hydronephrosis.

 

ADRENAL GLANDS:     

Within normal limits.

 

VASCULAR:     

There is no aortic aneurysm.

 

BOWEL/MESENTERY:     

No dilated loops of small large bowel.  Diverticula are seen scattered throughout the entire colon wi
th greatest concentration in the sigmoid colon; no radiographic evidence of diverticulitis.

 

ABDOMINAL WALL:     

Within normal limits.

 

RETROPERITONEUM:     

There is no lymphadenopathy.

 

BLADDER:     

No wall thickening or mass.

 

REPRODUCTIVE:     

Hysterectomy.  In the left adnexal region, there is a 3.7 x 2.3 cm smooth margin oval low dense area 
with mean CT density 7 Hounsfield units.  This could be ovarian in origin.

 

INGUINAL:     

There is no lymphadenopathy or hernia.

 

MUSCULOSKELETAL:     

Within normal limits for patient age.

 

CONCLUSION:     

1. Abnormal appearance the peripancreatic tissues with induration of the fat and poor delineation of 
the head and body.  The findings suggest pancreatitis.  No focal cystic areas seen and no calcificati
ons.

2. Diffuse fatty change in the liver.

3. Diverticulosis of the colon without radiographic evidence of diverticulitis.

4. 3.7 cm low-density lesion in the left adnexal region of uncertain significance.  This could be rel
ated to the ovary.

 

 

 

 Saeid Singh MD on 2018 at 17:47           

Board Certified Radiologist.

 This report was verified electronically.

## 2018-02-21 VITALS
TEMPERATURE: 96.8 F | DIASTOLIC BLOOD PRESSURE: 82 MMHG | OXYGEN SATURATION: 97 % | HEART RATE: 96 BPM | RESPIRATION RATE: 16 BRPM | SYSTOLIC BLOOD PRESSURE: 150 MMHG

## 2018-02-21 VITALS
RESPIRATION RATE: 16 BRPM | HEART RATE: 98 BPM | DIASTOLIC BLOOD PRESSURE: 63 MMHG | OXYGEN SATURATION: 97 % | SYSTOLIC BLOOD PRESSURE: 127 MMHG | TEMPERATURE: 97.5 F

## 2018-02-21 VITALS
RESPIRATION RATE: 18 BRPM | HEART RATE: 91 BPM | DIASTOLIC BLOOD PRESSURE: 84 MMHG | OXYGEN SATURATION: 97 % | TEMPERATURE: 98 F | SYSTOLIC BLOOD PRESSURE: 119 MMHG

## 2018-02-21 VITALS
DIASTOLIC BLOOD PRESSURE: 63 MMHG | OXYGEN SATURATION: 96 % | RESPIRATION RATE: 20 BRPM | SYSTOLIC BLOOD PRESSURE: 136 MMHG | TEMPERATURE: 98.6 F | HEART RATE: 89 BPM

## 2018-02-21 LAB
BACTERIA #/AREA URNS HPF: (no result) /HPF
BASOPHILS # BLD AUTO: 0 TH/MM3 (ref 0–0.2)
BASOPHILS NFR BLD: 0.4 % (ref 0–2)
BUN SERPL-MCNC: 9 MG/DL (ref 7–18)
CALCIUM SERPL-MCNC: 8.6 MG/DL (ref 8.5–10.1)
CHLORIDE SERPL-SCNC: 106 MEQ/L (ref 98–107)
COLOR UR: YELLOW
CREAT SERPL-MCNC: 0.75 MG/DL (ref 0.5–1)
EOSINOPHIL # BLD: 0 TH/MM3 (ref 0–0.4)
EOSINOPHIL NFR BLD: 0 % (ref 0–4)
ERYTHROCYTE [DISTWIDTH] IN BLOOD BY AUTOMATED COUNT: 15.1 % (ref 11.6–17.2)
GFR SERPLBLD BASED ON 1.73 SQ M-ARVRAT: 75 ML/MIN (ref 89–?)
GLUCOSE SERPL-MCNC: 81 MG/DL (ref 74–106)
GLUCOSE UR STRIP-MCNC: (no result) MG/DL
HCO3 BLD-SCNC: 9.7 MEQ/L (ref 21–32)
HCT VFR BLD CALC: 42.5 % (ref 35–46)
HGB BLD-MCNC: 14.3 GM/DL (ref 11.6–15.3)
HGB UR QL STRIP: (no result)
KETONES UR STRIP-MCNC: (no result) MG/DL
LYMPHOCYTES # BLD AUTO: 1.7 TH/MM3 (ref 1–4.8)
LYMPHOCYTES NFR BLD AUTO: 17.5 % (ref 9–44)
MCH RBC QN AUTO: 34.3 PG (ref 27–34)
MCHC RBC AUTO-ENTMCNC: 33.6 % (ref 32–36)
MCV RBC AUTO: 102.2 FL (ref 80–100)
MONOCYTE #: 0.5 TH/MM3 (ref 0–0.9)
MONOCYTES NFR BLD: 4.8 % (ref 0–8)
MUCOUS THREADS #/AREA URNS LPF: (no result) /LPF
NEUTROPHILS # BLD AUTO: 7.5 TH/MM3 (ref 1.8–7.7)
NEUTROPHILS NFR BLD AUTO: 77.3 % (ref 16–70)
NITRITE UR QL STRIP: (no result)
PLATELET # BLD: 162 TH/MM3 (ref 150–450)
PMV BLD AUTO: 8.4 FL (ref 7–11)
RBC # BLD AUTO: 4.16 MIL/MM3 (ref 4–5.3)
SODIUM SERPL-SCNC: 134 MEQ/L (ref 136–145)
SP GR UR STRIP: 1.02 (ref 1–1.03)
SQUAMOUS #/AREA URNS HPF: (no result) /HPF (ref 0–5)
URINE LEUKOCYTE ESTERASE: (no result)
WBC # BLD AUTO: 9.7 TH/MM3 (ref 4–11)

## 2018-02-21 RX ADMIN — PANTOPRAZOLE SCH MG: 40 TABLET, DELAYED RELEASE ORAL at 08:21

## 2018-02-21 RX ADMIN — PHENYTOIN SODIUM SCH MLS/HR: 50 INJECTION INTRAMUSCULAR; INTRAVENOUS at 12:30

## 2018-02-21 RX ADMIN — ONDANSETRON PRN MG: 2 INJECTION, SOLUTION INTRAMUSCULAR; INTRAVENOUS at 08:21

## 2018-02-21 RX ADMIN — Medication SCH ML: at 20:46

## 2018-02-21 RX ADMIN — Medication SCH ML: at 08:21

## 2018-02-21 RX ADMIN — PHENYTOIN SODIUM SCH MLS/HR: 50 INJECTION INTRAMUSCULAR; INTRAVENOUS at 20:46

## 2018-02-21 RX ADMIN — ENOXAPARIN SODIUM SCH MG: 40 INJECTION SUBCUTANEOUS at 20:46

## 2018-02-21 NOTE — PD.CONS
HPI


History of Present Illness


This is a 78 year old female who was admitted to the hospital today with 

symptoms of upper abdominal pain associated with nausea and vomiting since the 

last 4 days.  She denied any history of heartburn dysphagia constipation 

diarrhea hematemesis melena hematochezia jaundice pruritus ascites edema 

anorexia or weight loss.





PFSH


Past Medical History


She gives history chronic osteoarthritis.


Past Surgical History


c/s x3, BEN


Appy


Breast Bx


R Knee replacement


Coded Allergies:  


     iodine (Unverified  Adverse Reaction, Severe, FLUSH, 18)


 13  DENIES ALLERGY


     potassium iodide (Unverified  Adverse Reaction, Severe, FLUSH, 18)


 13  DENIES ALLERGY


     povidone-iodine (Unverified  Adverse Reaction, Severe, FLUSH, 18)


 13  DENIES ALLERGY


     sodium iodide (Unverified  Adverse Reaction, Severe, FLUSH, 18)


 13  DENIES ALLERGY


     sodium iodide (Unverified  Adverse Reaction, Severe, FLUSH, 18)


 13  DENIES ALLERGY


     cephalexin (Unverified  Adverse Reaction, Intermediate, NAUSEA, 18)


Medications


Motrin, Nexium


Family History


mother had a Brain aneurysm


Father had parkinson's and CHF


Brother with Parkinson's and CHF


Sister  at 43 with a CVA


No history of colorectal cancer, polyps, pancreatic diseases in the family


Social History


No tobacco


She gives history of consuming alcohol on a regular basis.  On the average she 

drinks 4-5 times a week


 one dui 8 years ago


 (  of pancreatic CA)


golfs 3x week





Review of Systems


Gastrointestinal:  COMPLAINS OF: Abdominal pain, Nausea, Vomiting, DENIES: 

Black stools, Bloody stools, Constipation, Diarrhea, Difficulty Swallowing, 

Anorexia, Odynophagia, Swelling of Abdomen, Heartburn, Hematemesis





GI Exam


Vitals I&O





Vital Signs








  Date Time  Temp Pulse Resp B/P (MAP) Pulse Ox O2 Delivery O2 Flow Rate FiO2


 


18 17:12 98.0 91 18 119/84 (96) 97   


 


18 09:13 96.8 96 16 150/82 (104) 97   


 


18 00:00 97.5 98 16 127/63 (84) 97   


 


18 21:15 96.6 88 18 161/74 (103) 95   


 


18 20:54        


 


18 20:48  89 18 137/71 (93) 95 Room Air  














I/O      


 


 18





 07:00 15:00 23:00 07:00 15:00 23:00


 


Intake Total    240 ml  400 ml


 


Balance    240 ml  400 ml


 


      


 


Intake Oral    240 ml  400 ml


 


# Voids   1 3  3


 


# Bowel Movements   1 0  








Imaging


CT scan of the abdomen revealed abnormal pancreas and peripancreatic tissue.  

Scan detected induration of fat and poor delineation of the head and body of 

the pancreas.


MRCP showed no evidence of intra-or extra hepatic bile duct dilation


Laboratory











Test


  18


20:40 18


01:16 18


04:49


 


Potassium Level 4.2 MEQ/L   5.0 MEQ/L 


 


Ethyl Alcohol Level


  LESS THAN 3


MG/DL 


  


 


 


Urine Color  YELLOW  


 


Urine Turbidity  CLEAR  


 


Urine pH  6.0  


 


Urine Specific Gravity  1.018  


 


Urine Protein  100 mg/dL  


 


Urine Glucose (UA)  NEG mg/dL  


 


Urine Ketones


  


  80 OR GREATER


mg/dL 


 


 


Urine Occult Blood  MOD  


 


Urine Nitrite  NEG  


 


Urine Bilirubin  NEG  


 


Urine Leukocyte Esterase  NEG  


 


Urine RBC  10-14 /hpf  


 


Urine WBC  20-24 /hpf  


 


Urine Squamous Epithelial


Cells 


  0-5 /hpf 


  


 


 


Urine Bacteria  OCC /hpf  


 


Urine Mucus  OCC /lpf  


 


Microscopic Urinalysis Comment


  


  CULTURE


INDICATED 


 


 


White Blood Count   9.7 TH/MM3 


 


Red Blood Count   4.16 MIL/MM3 


 


Hemoglobin   14.3 GM/DL 


 


Hematocrit   42.5 % 


 


Mean Corpuscular Volume   102.2 FL 


 


Mean Corpuscular Hemoglobin   34.3 PG 


 


Mean Corpuscular Hemoglobin


Concent 


  


  33.6 % 


 


 


Red Cell Distribution Width   15.1 % 


 


Platelet Count   162 TH/MM3 


 


Mean Platelet Volume   8.4 FL 


 


Neutrophils (%) (Auto)   77.3 % 


 


Lymphocytes (%) (Auto)   17.5 % 


 


Monocytes (%) (Auto)   4.8 % 


 


Eosinophils (%) (Auto)   0.0 % 


 


Basophils (%) (Auto)   0.4 % 


 


Neutrophils # (Auto)   7.5 TH/MM3 


 


Lymphocytes # (Auto)   1.7 TH/MM3 


 


Monocytes # (Auto)   0.5 TH/MM3 


 


Eosinophils # (Auto)   0.0 TH/MM3 


 


Basophils # (Auto)   0.0 TH/MM3 


 


CBC Comment   DIFF FINAL 


 


Differential Comment    


 


Blood Urea Nitrogen   9 MG/DL 


 


Creatinine   0.75 MG/DL 


 


Random Glucose   81 MG/DL 


 


Calcium Level   8.6 MG/DL 


 


Sodium Level   134 MEQ/L 


 


Chloride Level   106 MEQ/L 


 


Carbon Dioxide Level   9.7 MEQ/L 


 


Anion Gap   18 MEQ/L 


 


Estimat Glomerular Filtration


Rate 


  


  75 ML/MIN 


 














 Date/Time


Source Procedure


Growth Status


 


 


 18 01:16


Urine Clean Catch Urine Culture


Pending Received





Serum lipase 8640.  Bilirubin 2.0 alkaline phosphatase 199  ALT 90


Physical Examination


HEENT: Pupils round and reactive to light; normocephalic; atraumatic; no 

jaundice.  Throat is clear.  Mild jaundice


NECK: Neck is supple, no JVD, no lymphadenopathy.


CHEST:  Chest is clear to auscultation and percussion.


CARDIAC:  Regular rate and rhythm with no murmur gallop or rubs.


ABDOMEN: Abdomen tender mild guarding mainly in the upper abdomen.  Ill-defined 

mass felt in the epigastrium consistent with pancreatic phlegmon.  Liver spleen 

not palpable no ascites bowel sounds sluggish


EXTREMITIES: No clubbing, cyanosis, or edema.


SKIN:  Normal; no rash; no jaundice.


CNS:  No focal deficits; alert and oriented times three.





Assessment and Plan


Assessment:  


(1) Abnormal liver enzymes


ICD Codes:  R74.8 - Abnormal levels of other serum enzymes


(2) Alcoholic pancreatitis


ICD Codes:  K85.20 - Alcohol induced acute pancreatitis without necrosis or 

infection


Plan


1.  Patient's likely has acute on chronic alcoholic pancreatitis.  Possibility 

of underlying hyper triglyceridemia autoimmune pancreatitis and pancreatic 

cancer.  Recommend labs of further evaluation of underlying cause for 

pancreatitis.


, lipid panel, IgG4,


2.  Abnormal liver panel likely due to alcoholic liver disease.  Possibility of 

underlying autoimmune hepatitis.  Left to further evaluation include HERRERA, ASMA, 

AMA.


3.  Continue p.o. clear fluids and IV fluids 4.  Monitor labs i.e. serum lipase 

and liver panel











Guicho Stuart MD 2018 19:47

## 2018-02-21 NOTE — RADRPT
EXAM DATE/TIME:  2018 12:15 

 

[facility] COMPARISON:     

No previous studies available for comparison.

       

 

 

INDICATIONS :     

Abdominal pain. Nausea and vomiting.

                     

 

MEDICAL HISTORY :           

Hiatal hernia.

 

SURGICAL HISTORY :     

Hysterectomy. Cholecystectomy.  section. Right knee replacement, cataracts and left breast bi
opsy.

 

ENCOUNTER:     

Initial

 

ACUITY:     

1 week

 

PAIN SCORE:     

6/10

 

LOCATION:     

Right upper quadrant 

 

TECHNIQUE:     

Multiplanar, multisequence magnetic resonance imaging of the abdomen was performed.  High-resolution 
3D dataset was utilized to reconstruct maximum-intensity projection (MIP) images.

 

FINDINGS:     

 

INTRAHEPATIC BILE DUCTS:     

Within normal limits. No significant anatomical variant is present.

 

EXTRAHEPATIC BILE DUCTS:     

The common bile duct measures 7 mm No stone or filling defect is identified.

 

GALLBLADDER:     

Cholecystectomy

 

LIVER:     

Normal size and signal intensity. No concerning liver lesion is identified on this non-contrast exam.


 

PANCREAS:     

The main pancreatic duct is normal in size.  There is no significant anatomical variant.  Signal inte
nsity is within normal limits.  No mass is visualized on this non-contrast exam.

 

OTHER:     

Moderate size hiatus hernia with intrathoracic location to the fundus measuring 6.4 cm in width.

 

CONCLUSION:     

No dilation of the intra-articular hepatic biliary ducts.  No filling defects seen.  The pancreatic d
uct is normal in dimension..

 

 

 

 Saeid Singh MD on 2018 at 13:39           

Board Certified Radiologist.

 This report was verified electronically.

## 2018-02-21 NOTE — HHI.HP
__________________________________________________





[facility]


Service


[facility]


Primary Care Physician


Non-Staff


Admission Diagnosis





Acute pancreatitis


Diagnoses:  


Chief Complaint:  


anorexia x 4 days


Travel History


International Travel<30 Days:  No


Contact w/Intl Traveler <30 Da:  No


Traveled to Known Affected Are:  No


History of Present Illness


patient is a 78 female with minimal Past medical history who complains of 4 

days of nausea and severe diffuse abd pain improved by nausea and Morphine. She 

had no fever or chills. Now hematemesis or hematochezia


She had elevated lipase and ct confirming clinical suspicion of pancreatitis 

and is therefore admitted.





Review of Systems


Constitutional:  DENIES: Diaphoretic episodes, Fatigue, Fever, Weight gain, 

Weight loss, Chills, Dizziness, Change in appetite, Night Sweats


Endocrine:  DENIES: Abnorml menstrual pattern, Heat/cold intolerance, Polydipsia

, Polyuria, Polyphagia


Eyes:  DENIES: Blurred vision, Diplopia, Eye inflammation, Eye pain, Vision loss

, Photosensitivity, Double Vision


Ears, nose, mouth, throat:  DENIES: Tinnitus, Hearing loss, Vertigo, Nasal 

discharge, Oral lesions, Throat pain, Hoarseness, Ear Pain, Running Nose, 

Epistaxis, Sinus Pain, Toothache, Odynophagia


Respiratory:  DENIES: Apneas, Cough, Snoring, Wheezing, Hemoptysis, Sputum 

production, Shortness of breath


Cardiovascular:  DENIES: Chest pain, Palpitations, Syncope, Dyspnea on Exertion

, PND, Lower Extremity Edema, Orthopnea, Claudication


Gastrointestinal:  COMPLAINS OF: Abdominal pain, Nausea, Vomiting, DENIES: 

Black stools, Bloody stools, Constipation, Diarrhea, Difficulty Swallowing, 

Anorexia


Genitourinary:  DENIES: Abnormal vaginal bleeding, Dysmenorrhea, Dyspareunia, 

Sexual dysfunction, Urinary frequency, Urinary incontinence, Urgency, Hematuria

, Dysuria, Nocturia, Vaginal discharge


Musculoskeletal:  DENIES: Joint pain, Muscle aches, Stiffness, Joint Swelling, 

Back pain, Neck pain


Integumentary:  DENIES: Abnormal pigmentation, Pruritus, Rash, Nail changes, 

Breast masses, Breast skin changes, Nipple discharge


Hematologic/lymphatic:  DENIES: Bruising, Lymphadenopathy


Immunologic/allergic:  DENIES: Eczema, Urticaria


Neurologic:  DENIES: Abnormal gait, Headache, Localized weakness, Paresthesias, 

Seizures, Speech Problems, Tremor, Poor Balance


Psychiatric:  DENIES: Anxiety, Confusion, Mood changes, Depression, 

Hallucinations, Agitation, Suicidal Ideation, Homicidal Ideation, Delusions





Past Family Social History


Past Medical History


Denies


Past Surgical History


c/s x3, BEN


Appy


Breast Bx


R Knee


Reported Medications


Reviewed in the EMR


Allergies:  


Coded Allergies:  


     iodine (Unverified  Adverse Reaction, Severe, FLUSH, 18)


 13  DENIES ALLERGY


     potassium iodide (Unverified  Adverse Reaction, Severe, FLUSH, 18)


 13  DENIES ALLERGY


     povidone-iodine (Unverified  Adverse Reaction, Severe, FLUSH, 18)


 13  DENIES ALLERGY


     sodium iodide (Unverified  Adverse Reaction, Severe, FLUSH, 18)


 13  DENIES ALLERGY


     sodium iodide (Unverified  Adverse Reaction, Severe, FLUSH, 18)


 13  DENIES ALLERGY


     cephalexin (Unverified  Adverse Reaction, Intermediate, NAUSEA, 18)


Active Ordered Medications


Reviewed in the EMR


Family History


mother had a Brain aneurysm


Father had parkinson's and CHF


Brother with Parkinson's and CHF


Sister  at 43 with a CVA


Social History


No tobacco


occ etoh, one dui 8 years ago


 (  of pancreatic CA)


golfs 3x week





Physical Exam


Vital Signs





Vital Signs








  Date Time  Temp Pulse Resp B/P (MAP) Pulse Ox O2 Delivery O2 Flow Rate FiO2


 


18 09:13 96.8 96 16 150/82 (104) 97   


 


18 00:00 97.5 98 16 127/63 (84) 97   


 


18 21:15 96.6 88 18 161/74 (103) 95   


 


18 20:54        


 


18 20:48  89 18 137/71 (93) 95 Room Air  


 


18 19:02   16     


 


18 18:58  92 16 135/70 (91) 94 Room Air  


 


18 17:37   16     


 


18 17:30  82 16 140/67 (91) 97 Room Air  


 


18 16:24 97.4 89 19 131/74 (93 96   








Physical Exam


GENERAL: This is a well-nourished, well-developed patient, in no apparent 

distress.


SKIN: No rashes, ecchymoses or lesions. Cool and dry.


HEAD: Atraumatic. Normocephalic. No temporal or scalp tenderness.


EYES: Pupils equal round and reactive. Extraocular motions intact. No scleral 

icterus. No injection or drainage. 


ENT: Nose without bleeding, purulent drainage or septal hematoma. Throat 

without erythema, tonsillar hypertrophy or exudate. Uvula midline. Airway 

patent.


NECK: Trachea midline. No JVD or lymphadenopathy. Supple, nontender, no 

meningeal signs.


CARDIOVASCULAR: Regular rate and rhythm without murmurs, gallops, or rubs. 


RESPIRATORY: Clear to auscultation. Breath sounds equal bilaterally. No wheezes

, rales, or rhonchi.  


GASTROINTESTINAL: Abdomen soft, diffusely tender,  nondistended. No hepato-

splenomegaly, or palpable masses. No guarding.


MUSCULOSKELETAL: Extremities without clubbing, cyanosis, or edema. No joint 

tenderness, effusion, or edema noted. No calf tenderness. Negative Homans sign 

bilaterally.


NEUROLOGICAL: Awake and alert. Cranial nerves II through XII intact.  Motor and 

sensory grossly within normal limits. Five out of 5 muscle strength in all 

muscle groups.  Normal speech.


Laboratory





Laboratory Tests








Test


  18


17:25 18


20:40 18


01:16 18


04:49


 


White Blood Count 6.5    9.7 


 


Red Blood Count 4.57    4.16 


 


Hemoglobin 15.1    14.3 


 


Hematocrit 46.9    42.5 


 


Mean Corpuscular Volume 102.6    102.2 


 


Mean Corpuscular Hemoglobin 33.1    34.3 


 


Mean Corpuscular Hemoglobin


Concent 32.2 


  


  


  33.6 


 


 


Red Cell Distribution Width 14.4    15.1 


 


Platelet Count 161    162 


 


Mean Platelet Volume 8.4    8.4 


 


Neutrophils (%) (Auto) 83.6    77.3 


 


Lymphocytes (%) (Auto) 13.1    17.5 


 


Monocytes (%) (Auto) 2.4    4.8 


 


Eosinophils (%) (Auto) 0.1    0.0 


 


Basophils (%) (Auto) 0.8    0.4 


 


Neutrophils # (Auto) 5.3    7.5 


 


Lymphocytes # (Auto) 0.9    1.7 


 


Monocytes # (Auto) 0.2    0.5 


 


Eosinophils # (Auto) 0.0    0.0 


 


Basophils # (Auto) 0.1    0.0 


 


CBC Comment DIFF FINAL    DIFF FINAL 


 


Differential Comment      


 


Prothrombin Time 11.8    


 


Prothromb Time International


Ratio 1.2 


  


  


  


 


 


Activated Partial


Thromboplast Time 27.3 


  


  


  


 


 


Blood Urea Nitrogen 7    9 


 


Creatinine 0.70    0.75 


 


Random Glucose 102    81 


 


Total Protein 8.5    


 


Albumin 3.4    


 


Calcium Level 8.7    8.6 


 


Alkaline Phosphatase 199    


 


Aspartate Amino Transf


(AST/SGOT) 304 


  


  


  


 


 


Alanine Aminotransferase


(ALT/SGPT) 90 


  


  


  


 


 


Total Bilirubin 2.0    


 


Sodium Level 134    134 


 


Potassium Level 5.8  4.2   5.0 


 


Chloride Level 102    106 


 


Carbon Dioxide Level 11.5    9.7 


 


Anion Gap 21    18 


 


Estimat Glomerular Filtration


Rate 81 


  


  


  75 


 


 


Lipase 8640    


 


Ethyl Alcohol Level  LESS THAN 3   


 


Urine Color   YELLOW  


 


Urine Turbidity   CLEAR  


 


Urine pH   6.0  


 


Urine Specific Gravity   1.018  


 


Urine Protein   100  


 


Urine Glucose (UA)   NEG  


 


Urine Ketones   80 OR GREATER  


 


Urine Occult Blood   MOD  


 


Urine Nitrite   NEG  


 


Urine Bilirubin   NEG  


 


Urine Leukocyte Esterase   NEG  


 


Urine RBC   10-14  


 


Urine WBC   20-24  


 


Urine Squamous Epithelial


Cells 


  


  0-5 


  


 


 


Urine Bacteria   OCC  


 


Urine Mucus   OCC  


 


Microscopic Urinalysis Comment


  


  


  CULTURE


INDICATED 


 














 Date/Time


Source Procedure


Growth Status


 


 


 18 01:16


Urine Clean Catch Urine Culture


Pending Received








Result Diagram:  


18 0449                                                                   

             18 0449





Imaging





Last Impressions








Abdomen/Pelvis CT 18 0000 Signed





Impressions: 





 Service Date/Time:  2018 17:07 - CONCLUSION:  1. 

Abnormal 





 appearance the peripancreatic tissues with induration of the fat and poor 





 delineation of the head and body.  The findings suggest pancreatitis.  No 

focal 





 cystic areas seen and no calcifications. 2. Diffuse fatty change in the liver. 





 3. Diverticulosis of the colon without radiographic evidence of 

diverticulitis. 





 4. 3.7 cm low-density lesion in the left adnexal region of uncertain 





 significance.  This could be related to the ovary.     Thomas J. Yuschok, MD Caprini VTE Risk Assessment


Caprini VTE Risk Assessment:  Mod/High Risk (score >= 2)


Caprini Risk Assessment Model











 Point Value = 1          Point Value = 2  Point Value = 3  Point Value = 5


 


Age 41-60


Minor surgery


BMI > 25 kg/m2


Swollen legs


Varicose veins


Pregnancy or postpartum


History of unexplained or recurrent


   spontaneous 


Oral contraceptives or hormone


   replacement


Sepsis (< 1 month)


Serious lung disease, including


   pneumonia (< 1 month)


Abnormal pulmonary function


Acute myocardial infarction


Congestive heart failure (< 1 month)


History of inflammatory bowel disease


Medical patient at bed rest Age 61-74


Arthroscopic surgery


Major open surgery (> 45 min)


Laparoscopic surgery (> 45 min)


Malignancy


Confined to bed (> 72 hours)


Immobilizing plaster cast


Central venous access Age >= 75


History of VTE


Family history of VTE


Factor V Leiden


Prothrombin 69282N


Lupus anticoagulant


Anticardiolipin antibodies


Elevated serum homocysteine


Heparin-induced thrombocytopenia


Other congenital or acquired


   thrombophilia Stroke (< 1 month)


Elective arthroplasty


Hip, pelvis, or leg fracture


Acute spinal cord injury (< 1 month)








Prophylaxis Regimen











   Total Risk


Factor Score Risk Level Prophylaxis Regimen


 


0-1      Low Early ambulation


 


2 Moderate Order ONE of the following:


*Sequential Compression Device (SCD)


*Heparin 5000 units SQ BID


 


3-4 Higher Order ONE of the following medications:


*Heparin 5000 units SQ TID


*Enoxaparin/Lovenox 40 mg SQ daily (WT < 150 kg, CrCl > 30 mL/min)


*Enoxaparin/Lovenox 30 mg SQ daily (WT < 150 kg, CrCl > 10-29 mL/min)


*Enoxaparin/Lovenox 30 mg SQ BID (WT < 150 kg, CrCl > 30 mL/min)


AND/OR


*Sequential Compression Device (SCD)


 


5 or more Highest Order ONE of the following medications:


*Heparin 5000 units SQ TID (Preferred with Epidurals)


*Enoxaparin/Lovenox 40 mg SQ daily (WT < 150 kg, CrCl > 30 mL/min)


*Enoxaparin/Lovenox 30 mg SQ daily (WT < 150 kg, CrCl > 10-29 mL/min)


*Enoxaparin/Lovenox 30 mg SQ BID (WT < 150 kg, CrCl > 30 mL/min)


AND


*Sequential Compression Device (SCD)











Assessment and Plan


Problem List:  


(1) Acute pancreatitis


ICD Code:  K85.90 - Acute pancreatitis without necrosis or infection, 

unspecified


Status:  Acute


Plan:  etiology unclear


MRCP pending


does not appear to be related to ETOH


IV narcotics for pain, clear liquids, IVF


GI consulted








Physician Certification


2 Midnight Certification Type:  Admission for Inpatient Services


Order for Inpatient Services


The services are ordered in accordance with Medicare regulations or non-

Medicare payer requirements, as applicable.  In the case of services not 

specified as inpatient-only, they are appropriately provided as inpatient 

services in accordance with the 2-midnight benchmark.


Estimated LOS (days):  4


4 days is the estimated time the patient will need to remain in the hospital, 

assuming treatment plan goals are met and no additional complications.


Post-Hospital Plan:  Home





Problem Qualifiers





(1) Acute pancreatitis:  


Qualified Codes:  K85.90 - Acute pancreatitis without necrosis or infection, 

unspecified








Millie Bello MD 2018 12:30

## 2018-02-21 NOTE — EKG
Date Performed: 02/20/2018       Time Performed: 16:49:48

 

PTAGE:      78 years

 

EKG:      Sinus rhythm 

 

 NORMAL ECG

 

PREVIOUS TRACING       : 10/14/2013 12.33

 

DOCTOR:   Doug Atkins  Interpretating Date/Time  02/21/2018 15:34:08

## 2018-02-22 VITALS
DIASTOLIC BLOOD PRESSURE: 85 MMHG | TEMPERATURE: 97.8 F | HEART RATE: 91 BPM | OXYGEN SATURATION: 94 % | RESPIRATION RATE: 16 BRPM | SYSTOLIC BLOOD PRESSURE: 121 MMHG

## 2018-02-22 VITALS
RESPIRATION RATE: 16 BRPM | OXYGEN SATURATION: 96 % | DIASTOLIC BLOOD PRESSURE: 76 MMHG | TEMPERATURE: 97.2 F | HEART RATE: 80 BPM | SYSTOLIC BLOOD PRESSURE: 132 MMHG

## 2018-02-22 VITALS
RESPIRATION RATE: 18 BRPM | OXYGEN SATURATION: 95 % | SYSTOLIC BLOOD PRESSURE: 122 MMHG | TEMPERATURE: 97.7 F | DIASTOLIC BLOOD PRESSURE: 58 MMHG | HEART RATE: 84 BPM

## 2018-02-22 VITALS
HEART RATE: 90 BPM | OXYGEN SATURATION: 96 % | RESPIRATION RATE: 20 BRPM | DIASTOLIC BLOOD PRESSURE: 65 MMHG | TEMPERATURE: 98.7 F | SYSTOLIC BLOOD PRESSURE: 134 MMHG

## 2018-02-22 VITALS
DIASTOLIC BLOOD PRESSURE: 62 MMHG | TEMPERATURE: 97.4 F | HEART RATE: 83 BPM | OXYGEN SATURATION: 96 % | RESPIRATION RATE: 20 BRPM | SYSTOLIC BLOOD PRESSURE: 127 MMHG

## 2018-02-22 LAB
ALBUMIN SERPL-MCNC: 2.8 GM/DL (ref 3.4–5)
ALP SERPL-CCNC: 150 U/L (ref 45–117)
ALT SERPL-CCNC: 59 U/L (ref 10–53)
AST SERPL-CCNC: 192 U/L (ref 15–37)
BASOPHILS # BLD AUTO: 0 TH/MM3 (ref 0–0.2)
BASOPHILS NFR BLD: 0.3 % (ref 0–2)
BILIRUB SERPL-MCNC: 1.7 MG/DL (ref 0.2–1)
BUN SERPL-MCNC: 9 MG/DL (ref 7–18)
CALCIUM SERPL-MCNC: 8.7 MG/DL (ref 8.5–10.1)
CHLORIDE SERPL-SCNC: 106 MEQ/L (ref 98–107)
CHOLEST SERPL-MCNC: 104 MG/DL (ref 120–200)
CHOLESTEROL/ HDL RATIO: 3.34 RATIO
CREAT SERPL-MCNC: 0.61 MG/DL (ref 0.5–1)
EOSINOPHIL # BLD: 0 TH/MM3 (ref 0–0.4)
EOSINOPHIL NFR BLD: 0.3 % (ref 0–4)
ERYTHROCYTE [DISTWIDTH] IN BLOOD BY AUTOMATED COUNT: 14.1 % (ref 11.6–17.2)
GFR SERPLBLD BASED ON 1.73 SQ M-ARVRAT: 95 ML/MIN (ref 89–?)
GLUCOSE SERPL-MCNC: 94 MG/DL (ref 74–106)
HCO3 BLD-SCNC: 21.1 MEQ/L (ref 21–32)
HCT VFR BLD CALC: 37.2 % (ref 35–46)
HDLC SERPL-MCNC: 31.1 MG/DL (ref 40–60)
HEPATITIS A AB IGM: NEGATIVE
HEPATITIS B CORE AB IGM: NEGATIVE
HEPATITIS B SURFACE ANTIGEN: NEGATIVE
HEPATITIS C AB IGG: NEGATIVE
HGB BLD-MCNC: 12.6 GM/DL (ref 11.6–15.3)
LDLC SERPL-MCNC: 55 MG/DL (ref 0–99)
LYMPHOCYTES # BLD AUTO: 1.7 TH/MM3 (ref 1–4.8)
LYMPHOCYTES NFR BLD AUTO: 19.8 % (ref 9–44)
MCH RBC QN AUTO: 33.9 PG (ref 27–34)
MCHC RBC AUTO-ENTMCNC: 33.7 % (ref 32–36)
MCV RBC AUTO: 100.5 FL (ref 80–100)
MONOCYTE #: 0.5 TH/MM3 (ref 0–0.9)
MONOCYTES NFR BLD: 6.3 % (ref 0–8)
NEUTROPHILS # BLD AUTO: 6.3 TH/MM3 (ref 1.8–7.7)
NEUTROPHILS NFR BLD AUTO: 73.3 % (ref 16–70)
PLATELET # BLD: 118 TH/MM3 (ref 150–450)
PMV BLD AUTO: 8.7 FL (ref 7–11)
PROT SERPL-MCNC: 7 GM/DL (ref 6.4–8.2)
RBC # BLD AUTO: 3.7 MIL/MM3 (ref 4–5.3)
SODIUM SERPL-SCNC: 139 MEQ/L (ref 136–145)
TRIGL SERPL-MCNC: 88 MG/DL (ref 42–150)
WBC # BLD AUTO: 8.5 TH/MM3 (ref 4–11)

## 2018-02-22 RX ADMIN — PANTOPRAZOLE SODIUM SCH MG: 40 INJECTION, POWDER, FOR SOLUTION INTRAVENOUS at 20:44

## 2018-02-22 RX ADMIN — ENOXAPARIN SODIUM SCH MG: 40 INJECTION SUBCUTANEOUS at 20:44

## 2018-02-22 RX ADMIN — PANTOPRAZOLE SODIUM SCH MG: 40 INJECTION, POWDER, FOR SOLUTION INTRAVENOUS at 15:14

## 2018-02-22 RX ADMIN — HYDROCODONE BITARTRATE AND ACETAMINOPHEN PRN TAB: 7.5; 325 TABLET ORAL at 15:25

## 2018-02-22 RX ADMIN — HYDROCODONE BITARTRATE AND ACETAMINOPHEN PRN TAB: 7.5; 325 TABLET ORAL at 23:47

## 2018-02-22 RX ADMIN — Medication SCH ML: at 09:00

## 2018-02-22 RX ADMIN — Medication SCH ML: at 20:37

## 2018-02-22 NOTE — HHI.GIFU
Subjective


Remarks


Patient reports significant improvement in her symptoms of abdominal pain, 

abdominal distention nausea and vomiting.  She had an episode of pain following 

eating a turkey sandwich this afternoon.  She denies any fever chills jaundice 

or pruritus.  Lipase level from today show improvement.





Objective


Vitals I&O





Vital Signs








  Date Time  Temp Pulse Resp B/P (MAP) Pulse Ox O2 Delivery O2 Flow Rate FiO2


 


2/22/18 16:35   18     


 


2/22/18 16:32 97.7 84 18 122/58 (79) 95   


 


2/22/18 12:00 97.8 91 16 121/85 (97) 94   


 


2/22/18 08:00 97.2 80 16 132/76 (94) 96   


 


2/22/18 00:00 98.7 90 20 134/65 (88) 96   


 


2/21/18 20:00 98.6 89 20 136/63 (87) 96   














I/O      


 


 2/21/18 2/21/18 2/21/18 2/22/18 2/22/18 2/22/18





 07:00 15:00 23:00 07:00 15:00 23:00


 


Intake Total 240 ml  820 ml   507 ml


 


Output Total     400 ml 100 ml


 


Balance 240 ml  820 ml  -400 ml 407 ml


 


      


 


Intake Oral 240 ml  820 ml   507 ml


 


Output Urine Total     400 ml 100 ml


 


# Voids 3  5   


 


# Bowel Movements 0  0   








Laboratory





Laboratory Tests








Test


  2/22/18


06:00


 


White Blood Count 8.5 


 


Red Blood Count 3.70 


 


Hemoglobin 12.6 


 


Hematocrit 37.2 


 


Mean Corpuscular Volume 100.5 


 


Mean Corpuscular Hemoglobin 33.9 


 


Mean Corpuscular Hemoglobin


Concent 33.7 


 


 


Red Cell Distribution Width 14.1 


 


Platelet Count 118 


 


Mean Platelet Volume 8.7 


 


Neutrophils (%) (Auto) 73.3 


 


Lymphocytes (%) (Auto) 19.8 


 


Monocytes (%) (Auto) 6.3 


 


Eosinophils (%) (Auto) 0.3 


 


Basophils (%) (Auto) 0.3 


 


Neutrophils # (Auto) 6.3 


 


Lymphocytes # (Auto) 1.7 


 


Monocytes # (Auto) 0.5 


 


Eosinophils # (Auto) 0.0 


 


Basophils # (Auto) 0.0 


 


CBC Comment DIFF FINAL 


 


Differential Comment  


 


Blood Urea Nitrogen 9 


 


Creatinine 0.61 


 


Random Glucose 94 


 


Total Protein 7.0 


 


Albumin 2.8 


 


Calcium Level 8.7 


 


Alkaline Phosphatase 150 


 


Aspartate Amino Transf


(AST/SGOT) 192 


 


 


Alanine Aminotransferase


(ALT/SGPT) 59 


 


 


Total Bilirubin 1.7 


 


Sodium Level 139 


 


Potassium Level 2.9 


 


Chloride Level 106 


 


Carbon Dioxide Level 21.1 


 


Anion Gap 12 


 


Estimat Glomerular Filtration


Rate 95 


 


 


Magnesium Level 1.5 


 


Triglycerides Level 88 


 


Cholesterol Level 104 


 


LDL Cholesterol 55 


 


HDL Cholesterol 31.1 


 


Cholesterol/HDL Ratio 3.34 


 


Lipase 1923 


 


CA 19-9 Antigen 148.9 


 


Hepatitis A IgM Antibody NEGATIVE 


 


Hepatitis B Surface Antigen NEGATIVE 


 


Hepatitis B Core IgM Antibody NEGATIVE 


 


Hepatitis C Antibody NEGATIVE 














 Date/Time


Source Procedure


Growth Status


 


 


 2/21/18 01:16


Urine Clean Catch Urine Culture - Preliminary


Gram Negative Erick Resulted








Physical Exam


HEENT: Pupils round and reactive to light; normocephalic; atraumatic; no 

jaundice.  Throat is clear.


NECK: Neck is supple, no JVD, no lymphadenopathy.


CHEST:  Chest is clear to auscultation and percussion.


CARDIAC:  Regular rate and rhythm with no murmur gallop or rubs.


ABDOMEN:  Soft, nondistended, nontender; no hepatosplenomegaly; bowel sounds 

are present in all four quadrants.


EXTREMITIES: No clubbing, cyanosis, or edema.


SKIN:  Normal; no rash; no jaundice.


CNS:  No focal deficits; alert and oriented times three.





Assessment and Plan


Assessment:  


(1) Abnormal liver enzymes


ICD Codes:  R74.8 - Abnormal levels of other serum enzymes


(2) Alcoholic pancreatitis


ICD Codes:  K85.20 - Alcohol induced acute pancreatitis without necrosis or 

infection


Plan


1.  Acute on chronic alcoholic pancreatitis.  In view of elevated  levels 

CA pancreas and requires further evaluation possibility of underlying.  Patient 

would need EUS on an outpatient basis


2.  Advance diet slowly as tolerated.  Currently she should be only given full 

liquid diet


3.  Check lipase levels in the a.m.











Guicho Stuart MD Feb 22, 2018 19:25

## 2018-02-22 NOTE — HHI.PR
Subjective


Remarks


Patient seen and evaluated in follow-up for acute pancreatitis.  Apparently due 

to alcohol issues.  Overall improved.  Abdominal pain is improved.  Patient 

awake alert ambulatory without difficulty.  She would like to try advancing her 

diet





Objective


Vitals





Vital Signs








  Date Time  Temp Pulse Resp B/P (MAP) Pulse Ox O2 Delivery O2 Flow Rate FiO2


 


2/22/18 12:00 97.8 91 16 121/85 (97) 94   


 


2/22/18 08:00 97.2 80 16 132/76 (94) 96   


 


2/22/18 00:00 98.7 90 20 134/65 (88) 96   


 


2/21/18 20:00 98.6 89 20 136/63 (87) 96   


 


2/21/18 17:12 98.0 91 18 119/84 (96) 97   














I/O      


 


 2/21/18 2/21/18 2/21/18 2/22/18 2/22/18 2/22/18





 07:00 15:00 23:00 07:00 15:00 23:00


 


Intake Total 240 ml  820 ml   


 


Output Total     400 ml 


 


Balance 240 ml  820 ml  -400 ml 


 


      


 


Intake Oral 240 ml  820 ml   


 


Output Urine Total     400 ml 


 


# Voids 3  5   


 


# Bowel Movements 0  0   








Result Diagram:  


2/22/18 0600                                                                   

             2/22/18 0600





Imaging





Last Impressions








Cholangiopancreatography MRI 2/21/18 0000 Signed





Impressions: 





 Service Date/Time:  Wednesday, February 21, 2018 12:15 - CONCLUSION:  No 





 dilation of the intra-articular hepatic biliary ducts.  No filling defects 

seen. 





  The pancreatic duct is normal in dimension..     Saeid Singh MD 


 


Abdomen/Pelvis CT 2/20/18 0000 Signed





Impressions: 





 Service Date/Time:  Tuesday, February 20, 2018 17:07 - CONCLUSION:  1. 

Abnormal 





 appearance the peripancreatic tissues with induration of the fat and poor 





 delineation of the head and body.  The findings suggest pancreatitis.  No 

focal 





 cystic areas seen and no calcifications. 2. Diffuse fatty change in the liver. 





 3. Diverticulosis of the colon without radiographic evidence of 

diverticulitis. 





 4. 3.7 cm low-density lesion in the left adnexal region of uncertain 





 significance.  This could be related to the ovary.     Saeid Singh MD 








Objective Remarks


GENERAL: This is a well-nourished, well-developed patient, in no apparent 

distress.


CARDIOVASCULAR: Regular rate and rhythm without murmurs, gallops, or rubs. 


RESPIRATORY: Clear to auscultation. Breath sounds equal bilaterally. No wheezes

, rales, or rhonchi.  


GASTROINTESTINAL: Abdomen soft, non-tender, nondistended. Normal active bowel 

sounds


MUSCULOSKELETAL: Extremities without clubbing, cyanosis, or edema.


NEURO:  Alert & Oriented x4 to person, place, time, situation.  Moves all ext x4





A/P


Problem List:  


(1) Acute pancreatitis


ICD Code:  K85.90 - Acute pancreatitis without necrosis or infection, 

unspecified


Status:  Acute


Plan:  Improved symptoms


MRCP unremarkable for obstruction,


May indeed be related to ETOH


IV narcotics for pain, advance diet as tolerated


GI consultation appreciated





Discharge Planning


If patient able to tolerate diet and pain medication by mouth likely discharge 

home in a.m.





Problem Qualifiers





(1) Acute pancreatitis:  


Qualified Codes:  K85.90 - Acute pancreatitis without necrosis or infection, 

unspecified








Millie Bello MD Feb 22, 2018 13:02

## 2018-02-23 VITALS
OXYGEN SATURATION: 93 % | SYSTOLIC BLOOD PRESSURE: 106 MMHG | RESPIRATION RATE: 18 BRPM | DIASTOLIC BLOOD PRESSURE: 68 MMHG | HEART RATE: 87 BPM

## 2018-02-23 VITALS
OXYGEN SATURATION: 98 % | HEART RATE: 91 BPM | DIASTOLIC BLOOD PRESSURE: 76 MMHG | SYSTOLIC BLOOD PRESSURE: 126 MMHG | RESPIRATION RATE: 20 BRPM | TEMPERATURE: 97.4 F

## 2018-02-23 VITALS
DIASTOLIC BLOOD PRESSURE: 58 MMHG | HEART RATE: 86 BPM | SYSTOLIC BLOOD PRESSURE: 116 MMHG | RESPIRATION RATE: 18 BRPM | OXYGEN SATURATION: 95 % | TEMPERATURE: 96.7 F

## 2018-02-23 VITALS
HEART RATE: 80 BPM | SYSTOLIC BLOOD PRESSURE: 126 MMHG | DIASTOLIC BLOOD PRESSURE: 71 MMHG | RESPIRATION RATE: 20 BRPM | TEMPERATURE: 97.1 F | OXYGEN SATURATION: 98 %

## 2018-02-23 VITALS — HEART RATE: 87 BPM | DIASTOLIC BLOOD PRESSURE: 63 MMHG | SYSTOLIC BLOOD PRESSURE: 122 MMHG

## 2018-02-23 LAB
BUN SERPL-MCNC: 10 MG/DL (ref 7–18)
CALCIUM SERPL-MCNC: 9 MG/DL (ref 8.5–10.1)
CHLORIDE SERPL-SCNC: 101 MEQ/L (ref 98–107)
CREAT SERPL-MCNC: 0.42 MG/DL (ref 0.5–1)
GFR SERPLBLD BASED ON 1.73 SQ M-ARVRAT: 146 ML/MIN (ref 89–?)
GLUCOSE SERPL-MCNC: 108 MG/DL (ref 74–106)
HCO3 BLD-SCNC: 24.4 MEQ/L (ref 21–32)
MAGNESIUM SERPL-MCNC: 1.7 MG/DL (ref 1.5–2.5)
SODIUM SERPL-SCNC: 136 MEQ/L (ref 136–145)

## 2018-02-23 RX ADMIN — SODIUM CHLORIDE SCH MLS/HR: 900 INJECTION INTRAVENOUS at 11:11

## 2018-02-23 RX ADMIN — ENOXAPARIN SODIUM SCH MG: 40 INJECTION SUBCUTANEOUS at 20:38

## 2018-02-23 RX ADMIN — POTASSIUM CHLORIDE ONE MEQ: 1.5 POWDER, FOR SOLUTION ORAL at 11:36

## 2018-02-23 RX ADMIN — Medication SCH ML: at 08:55

## 2018-02-23 RX ADMIN — PANTOPRAZOLE SODIUM SCH MG: 40 INJECTION, POWDER, FOR SOLUTION INTRAVENOUS at 22:05

## 2018-02-23 RX ADMIN — PANTOPRAZOLE SODIUM SCH MG: 40 INJECTION, POWDER, FOR SOLUTION INTRAVENOUS at 11:14

## 2018-02-23 RX ADMIN — Medication SCH ML: at 22:06

## 2018-02-23 RX ADMIN — HYDROCODONE BITARTRATE AND ACETAMINOPHEN PRN TAB: 7.5; 325 TABLET ORAL at 22:06

## 2018-02-23 RX ADMIN — POTASSIUM CHLORIDE ONE MEQ: 1.5 POWDER, FOR SOLUTION ORAL at 11:42

## 2018-02-23 RX ADMIN — HYDROCODONE BITARTRATE AND ACETAMINOPHEN PRN TAB: 7.5; 325 TABLET ORAL at 11:50

## 2018-02-23 NOTE — HHI.GIFU
Subjective


Remarks


Patient comfortably laying on the bed.  She has no acute distress.  She denies 

any significant abdominal pain nausea vomiting constipation diarrhea fever 

chills GI bleeding.  Labs done today showed presence of hypokalemia for which 

patient is being treated.  There is mild elevation in serum lipase level as 

compared to yesterday.





Objective


Vitals I&O





Vital Signs








  Date Time  Temp Pulse Resp B/P (MAP) Pulse Ox O2 Delivery O2 Flow Rate FiO2


 


2/23/18 16:00  87  122/63 (82)    


 


2/23/18 13:00  87 18 106/68 (81) 93   


 


2/23/18 08:00 96.7 86 18 116/58 (77) 95   


 


2/23/18 00:00 97.1 80 20 126/71 (89) 98   














I/O      


 


 2/22/18 2/22/18 2/22/18 2/23/18 2/23/18 2/23/18





 07:00 15:00 23:00 07:00 15:00 23:00


 


Intake Total   507 ml 420 ml 1100 ml 


 


Output Total  400 ml 100 ml   100 ml


 


Balance  -400 ml 407 ml 420 ml 1100 ml -100 ml


 


      


 


Intake Oral   507 ml 420 ml  


 


IV Total     1100 ml 


 


Output Urine Total  400 ml 100 ml   100 ml


 


# Voids    3 3 


 


# Bowel Movements    0  








Laboratory





Laboratory Tests








Test


  2/23/18


10:00


 


Blood Urea Nitrogen 10 


 


Creatinine 0.42 


 


Random Glucose 108 


 


Calcium Level 9.0 


 


Magnesium Level 1.7 


 


Sodium Level 136 


 


Potassium Level 2.9 


 


Chloride Level 101 


 


Carbon Dioxide Level 24.4 


 


Anion Gap 11 


 


Estimat Glomerular Filtration


Rate 146 


 


 


Lipase 2035 














 Date/Time


Source Procedure


Growth Status


 


 


 2/21/18 01:16


Urine Clean Catch Urine Culture - Final


Escherichia Coli Complete








Physical Exam


HEENT: Pupils round and reactive to light; normocephalic; atraumatic; no 

jaundice.  Throat is clear.


NECK: Neck is supple, no JVD, no lymphadenopathy.


CHEST:  Chest is clear to auscultation and percussion.


CARDIAC:  Regular rate and rhythm with no murmur gallop or rubs.


ABDOMEN:  Soft, nondistended, nontender; no hepatosplenomegaly; bowel sounds 

are present in all four quadrants.


EXTREMITIES: No clubbing, cyanosis, or edema.


SKIN:  Normal; no rash; no jaundice.


CNS:  No focal deficits; alert and oriented times three.





Assessment and Plan


Assessment:  


(1) Abnormal liver enzymes


ICD Codes:  R74.8 - Abnormal levels of other serum enzymes


(2) Alcoholic pancreatitis


ICD Codes:  K85.20 - Alcohol induced acute pancreatitis without necrosis or 

infection


(3) Hypokalemia


ICD Codes:  E87.6 - Hypokalemia


Plan


1.  Acute on chronic alcoholic pancreatitis.  In view of elevated  levels 

CA pancreas and requires further evaluation possibility of underlying.  Patient 

would need EUS on an outpatient basis


2.  Advance diet slowly as tolerated.  Currently she should be only given full 

liquid diet


3.  Check lipase and potassium levels in the a.m.











Guicho Stuart MD Feb 23, 2018 20:21

## 2018-02-23 NOTE — HHI.PR
Subjective


Remarks


RN denies any deterioration since last night.  Patient has sepsis she had some 

mild pain yesterday.  But tolerated dinner fine without pain and nausea.  This 

morning tolerated applesauce well.





Objective





Vital Signs








  Date Time  Temp Pulse Resp B/P (MAP) Pulse Ox O2 Delivery O2 Flow Rate FiO2


 


2/23/18 08:00 96.7 86 18 116/58 (77) 95   


 


2/23/18 00:00 97.1 80 20 126/71 (89) 98   


 


2/22/18 20:00 97.4 83 20 127/62 (83) 96   


 


2/22/18 16:35   18     


 


2/22/18 16:32 97.7 84 18 122/58 (79) 95   


 


2/22/18 12:00 97.8 91 16 121/85 (97) 94   














I/O      


 


 2/22/18 2/22/18 2/22/18 2/23/18 2/23/18 2/23/18





 07:00 15:00 23:00 07:00 15:00 23:00


 


Intake Total   507 ml 420 ml  


 


Output Total  400 ml 100 ml   


 


Balance  -400 ml 407 ml 420 ml  


 


      


 


Intake Oral   507 ml 420 ml  


 


Output Urine Total  400 ml 100 ml   


 


# Voids    3  


 


# Bowel Movements    0  








Result Diagram:  


2/22/18 0600                                                                   

             2/23/18 1000





Objective Remarks


Mild abdominal tenderness to palpation diffusely, nondistended, soft otherwise, 

patient lying in bed, no acute distress





A/P


Assessment and Plan


A/P


Problem List:  


Acute pancreatitis 


Discussed GI for me to f/u on lipase - Lipase is slightly fluctuating upward, 

continue IV hydration with full liquid diet low-fat, discussed with 

gastroenterology. Stopping IV pain meds. trial of po meds today. starting oral 

laxative. immunology bloodwork pending. 





Elevated tumor marker - to have EUS done as outpatient per GI





hypokalemia - still recurring, low magnesium, replacing both IVF, repeat K in AM





newly dx UTI - E.coli, starting Rocephin





dropping h/h, repeat in AM - possibly dilutional but monitor





SCDs for now given dropping h/h











Monty Marquez MD Feb 23, 2018 11:05

## 2018-02-23 NOTE — HHI.DCPOC
Discharge Care Plan


Diagnosis:  


(1) Acute pancreatitis


(2) Alcoholic pancreatitis


(3) Abnormal liver enzymes


(4) UTI (urinary tract infection)


Additional Problems


AVOID ALCOHOL AND FATTY FOODS


Goals to Promote Your Health


* To prevent worsening of your condition and complications


* To maintain your health at the optimal level


Directions to Meet Your Goals


*** Take your medications as prescribed


*** Follow your dietary instruction


*** Follow activity as directed








*** Keep your appointments as scheduled


*** Take your immunizations and boosters as scheduled


*** If your symptoms worsen call your PCP, if no PCP go to Urgent Care Center 

or Emergency Room***


*** Smoking is Dangerous to Your Health. Avoid second hand smoke***


***Call the 24-hour hour crisis hotline for domestic abuse at 1-932.580.9423***











Monty Marquez MD Feb 23, 2018 10:06

## 2018-02-24 VITALS
HEART RATE: 87 BPM | OXYGEN SATURATION: 96 % | DIASTOLIC BLOOD PRESSURE: 70 MMHG | SYSTOLIC BLOOD PRESSURE: 106 MMHG | RESPIRATION RATE: 20 BRPM | TEMPERATURE: 96 F

## 2018-02-24 VITALS
SYSTOLIC BLOOD PRESSURE: 134 MMHG | OXYGEN SATURATION: 97 % | TEMPERATURE: 97.6 F | HEART RATE: 89 BPM | DIASTOLIC BLOOD PRESSURE: 81 MMHG | RESPIRATION RATE: 20 BRPM

## 2018-02-24 VITALS
RESPIRATION RATE: 18 BRPM | HEART RATE: 80 BPM | TEMPERATURE: 98.3 F | SYSTOLIC BLOOD PRESSURE: 120 MMHG | DIASTOLIC BLOOD PRESSURE: 68 MMHG | OXYGEN SATURATION: 93 %

## 2018-02-24 VITALS
DIASTOLIC BLOOD PRESSURE: 77 MMHG | SYSTOLIC BLOOD PRESSURE: 165 MMHG | OXYGEN SATURATION: 93 % | HEART RATE: 99 BPM | TEMPERATURE: 99 F | RESPIRATION RATE: 18 BRPM

## 2018-02-24 VITALS
RESPIRATION RATE: 18 BRPM | HEART RATE: 79 BPM | OXYGEN SATURATION: 93 % | SYSTOLIC BLOOD PRESSURE: 119 MMHG | TEMPERATURE: 97 F

## 2018-02-24 LAB
BASOPHILS # BLD AUTO: 0 TH/MM3 (ref 0–0.2)
BASOPHILS NFR BLD: 0.3 % (ref 0–2)
BUN SERPL-MCNC: 8 MG/DL (ref 7–18)
CALCIUM SERPL-MCNC: 8.7 MG/DL (ref 8.5–10.1)
CHLORIDE SERPL-SCNC: 101 MEQ/L (ref 98–107)
CREAT SERPL-MCNC: 0.31 MG/DL (ref 0.5–1)
EOSINOPHIL # BLD: 0.1 TH/MM3 (ref 0–0.4)
EOSINOPHIL NFR BLD: 1.1 % (ref 0–4)
ERYTHROCYTE [DISTWIDTH] IN BLOOD BY AUTOMATED COUNT: 14.7 % (ref 11.6–17.2)
GFR SERPLBLD BASED ON 1.73 SQ M-ARVRAT: 208 ML/MIN (ref 89–?)
GLUCOSE SERPL-MCNC: 107 MG/DL (ref 74–106)
HCO3 BLD-SCNC: 28.2 MEQ/L (ref 21–32)
HCT VFR BLD CALC: 36.9 % (ref 35–46)
HGB BLD-MCNC: 12.7 GM/DL (ref 11.6–15.3)
LYMPHOCYTES # BLD AUTO: 1.6 TH/MM3 (ref 1–4.8)
LYMPHOCYTES NFR BLD AUTO: 24.9 % (ref 9–44)
MCH RBC QN AUTO: 34.2 PG (ref 27–34)
MCHC RBC AUTO-ENTMCNC: 34.5 % (ref 32–36)
MCV RBC AUTO: 99.2 FL (ref 80–100)
MITOCHONDRIA AB SER QL: (no result) U (ref ?–20)
MONOCYTE #: 0.6 TH/MM3 (ref 0–0.9)
MONOCYTES NFR BLD: 9 % (ref 0–8)
NEUTROPHILS # BLD AUTO: 4.1 TH/MM3 (ref 1.8–7.7)
NEUTROPHILS NFR BLD AUTO: 64.7 % (ref 16–70)
PLATELET # BLD: 150 TH/MM3 (ref 150–450)
PMV BLD AUTO: 8.3 FL (ref 7–11)
RBC # BLD AUTO: 3.72 MIL/MM3 (ref 4–5.3)
SODIUM SERPL-SCNC: 136 MEQ/L (ref 136–145)
WBC # BLD AUTO: 6.4 TH/MM3 (ref 4–11)

## 2018-02-24 RX ADMIN — Medication SCH ML: at 08:48

## 2018-02-24 RX ADMIN — Medication SCH ML: at 22:10

## 2018-02-24 RX ADMIN — HYDROCODONE BITARTRATE AND ACETAMINOPHEN PRN TAB: 7.5; 325 TABLET ORAL at 22:12

## 2018-02-24 RX ADMIN — SODIUM CHLORIDE SCH MLS/HR: 900 INJECTION INTRAVENOUS at 10:00

## 2018-02-24 RX ADMIN — HEPARIN SODIUM SCH UNITS: 10000 INJECTION, SOLUTION INTRAVENOUS; SUBCUTANEOUS at 22:11

## 2018-02-24 RX ADMIN — PANTOPRAZOLE SODIUM SCH MG: 40 INJECTION, POWDER, FOR SOLUTION INTRAVENOUS at 11:00

## 2018-02-24 RX ADMIN — PANTOPRAZOLE SODIUM SCH MG: 40 INJECTION, POWDER, FOR SOLUTION INTRAVENOUS at 22:11

## 2018-02-24 RX ADMIN — HYDROCODONE BITARTRATE AND ACETAMINOPHEN PRN TAB: 7.5; 325 TABLET ORAL at 08:49

## 2018-02-24 RX ADMIN — STANDARDIZED SENNA CONCENTRATE AND DOCUSATE SODIUM SCH TAB: 8.6; 5 TABLET, FILM COATED ORAL at 22:11

## 2018-02-24 RX ADMIN — HYDROCODONE BITARTRATE AND ACETAMINOPHEN PRN TAB: 7.5; 325 TABLET ORAL at 14:57

## 2018-02-24 NOTE — HHI.PR
Subjective


Remarks


Follow-up acute pancreatitis.  Patient seen and examined, lying in bed 

comfortably.  With mild tenderness to palpation of left upper quadrant.  

Patient has been tolerating p.o. intake well, denies any nausea or vomiting.  

No diarrhea.  Has been ambulating well.  Spoke to GI who plans to see patient 

today, to follow-up with tumor markers and elevated lipase.  Vital signs are 

stable.





Objective


Vitals





Vital Signs








  Date Time  Temp Pulse Resp B/P (MAP) Pulse Ox O2 Delivery O2 Flow Rate FiO2


 


2/24/18 09:49   20     


 


2/24/18 08:00 99.0 99 18 165/77 (106) 93   


 


2/24/18 00:00 97.6 89 20 134/81 (98) 97   


 


2/23/18 20:00 97.4 91 20 126/76 (93) 98   


 


2/23/18 16:00  87  122/63 (82)    














I/O      


 


 2/23/18 2/23/18 2/23/18 2/24/18 2/24/18 2/24/18





 06:59 14:59 22:59 06:59 14:59 22:59


 


Intake Total 420 ml 1100 ml  1000 ml  


 


Output Total   100 ml   


 


Balance 420 ml 1100 ml -100 ml 1000 ml  


 


      


 


Intake Oral 420 ml   1000 ml  


 


IV Total  1100 ml    


 


Output Urine Total   100 ml   


 


# Voids 3  3 3  


 


# Bowel Movements 0   0  








Result Diagram:  


2/24/18 0726 2/24/18 0726





Imaging





Last Impressions








Cholangiopancreatography MRI 2/21/18 0000 Signed





Impressions: 





 Service Date/Time:  Wednesday, February 21, 2018 12:15 - CONCLUSION:  No 





 dilation of the intra-articular hepatic biliary ducts.  No filling defects 

seen. 





  The pancreatic duct is normal in dimension..     Saeid Singh MD 


 


Abdomen/Pelvis CT 2/20/18 0000 Signed





Impressions: 





 Service Date/Time:  Tuesday, February 20, 2018 17:07 - CONCLUSION:  1. 

Abnormal 





 appearance the peripancreatic tissues with induration of the fat and poor 





 delineation of the head and body.  The findings suggest pancreatitis.  No 

focal 





 cystic areas seen and no calcifications. 2. Diffuse fatty change in the liver. 





 3. Diverticulosis of the colon without radiographic evidence of 

diverticulitis. 





 4. 3.7 cm low-density lesion in the left adnexal region of uncertain 





 significance.  This could be related to the ovary.     Saeid Singh MD 








Objective Remarks


GENERAL: Well-developed, well-nourished patient in NAD.


SKIN: Warm and dry. No rash.


HEAD:  Normocephalic. Atraumatic.


EYES: Pupils equal and round. No scleral icterus. No injection or drainage. 


ENT: No nasal bleeding or discharge.  Mucous membranes pink and moist.


NECK: Supple. Trachea midline.  


CARDIOVASCULAR: Regular rate and rhythm.  S1, S2 noted. No murmur appreciated. 


RESPIRATORY: No accessory muscle use. Clear to auscultation. Breath sounds 

equal bilaterally.  


GASTROINTESTINAL: Abdomen soft. Normoactive bowel sounds x4.  Pain to palpation 

left upper quadrant.


MUSCULOSKELETAL: No obvious deformities. Extremities without clubbing, cyanosis

, or edema. 


NEUROLOGICAL: Awake and alert. No obvious cranial nerve deficits.  Motor 

grossly within normal limits. 5/5 muscle strength in bilateral upper and lower 

extremities.  Normal speech.


PSYCHIATRIC: Appropriate mood and affect; insight and judgment normal.





A/P


Problem List:  


(1) Acute pancreatitis


ICD Code:  K85.90 - Acute pancreatitis without necrosis or infection, 

unspecified


Status:  Acute


Assessment and Plan


Patient is a 78 female with minimal Past medical history who complains of 4 

days of nausea and severe diffuse abd pain improved by nausea and Morphine. 





Acute pancreatitis with left upper quadrant abdominal pain


   Abdominal pelvis CT reviewed showing abnormal appearance of the 

peripancreatic tissues, suggest pancreatitis.  Diffuse fatty liver.  

Diverticulitis.  3.7 cm low-density lesion in the left adnexal region.


   MRCP performed showing no dilation of the hepatic biliary ducts.  No filling 

defects.  Pancreatic duct normal.  Hepatitis panel negative.  CA 19-9 antigen 

148.9.


   Gastroenterology following, appreciate input recommendations.  IgG pending.  

Follow.


   CBC and BMP reviewed, essentially unremarkable.  Afebrile.


   Lipase elevated, 2000.  Follow trend.


   Supportive care.  Patient tolerating p.o. diet.  Assess for nausea vomiting 

or diarrhea.


   Control pain, Norco available as needed.


   Continue Protonix 40 IV twice daily.





Urinary tract infection, urine culture growing E. coli.  Placed on Rocephin.  

Continue.





Hypokalemia, status post replacement:  Potassium level 2.9 yesterday.  Resolved 

3.5 today.





DVT prophylaxis: SCDs.





Problem Qualifiers





(1) Acute pancreatitis:  


Qualified Codes:  K85.90 - Acute pancreatitis without necrosis or infection, 

unspecified








Disha Snigh Feb 24, 2018 13:56

## 2018-02-24 NOTE — HHI.GIFU
Subjective


Remarks


Patient resting in bed doing well minimal abdominal discomfort tolerating intake





Objective


Vitals I&O





Vital Signs








  Date Time  Temp Pulse Resp B/P (MAP) Pulse Ox O2 Delivery O2 Flow Rate FiO2


 


2/24/18 12:00 98.3 80 18 120/68 (85) 93   


 


2/24/18 09:49   20     


 


2/24/18 08:00 99.0 99 18 165/77 (106) 93   


 


2/24/18 00:00 97.6 89 20 134/81 (98) 97   


 


2/23/18 20:00 97.4 91 20 126/76 (93) 98   


 


2/23/18 16:00  87  122/63 (82)    














I/O      


 


 2/23/18 2/23/18 2/23/18 2/24/18 2/24/18 2/24/18





 07:00 15:00 23:00 07:00 15:00 23:00


 


Intake Total 420 ml 1100 ml  1000 ml  


 


Output Total   100 ml   


 


Balance 420 ml 1100 ml -100 ml 1000 ml  


 


      


 


Intake Oral 420 ml   1000 ml  


 


IV Total  1100 ml    


 


Output Urine Total   100 ml   


 


# Voids 3 3  3  


 


# Bowel Movements 0   0  








Laboratory





Laboratory Tests








Test


  2/24/18


07:26


 


White Blood Count 6.4 


 


Red Blood Count 3.72 


 


Hemoglobin 12.7 


 


Hematocrit 36.9 


 


Mean Corpuscular Volume 99.2 


 


Mean Corpuscular Hemoglobin 34.2 


 


Mean Corpuscular Hemoglobin


Concent 34.5 


 


 


Red Cell Distribution Width 14.7 


 


Platelet Count 150 


 


Mean Platelet Volume 8.3 


 


Neutrophils (%) (Auto) 64.7 


 


Lymphocytes (%) (Auto) 24.9 


 


Monocytes (%) (Auto) 9.0 


 


Eosinophils (%) (Auto) 1.1 


 


Basophils (%) (Auto) 0.3 


 


Neutrophils # (Auto) 4.1 


 


Lymphocytes # (Auto) 1.6 


 


Monocytes # (Auto) 0.6 


 


Eosinophils # (Auto) 0.1 


 


Basophils # (Auto) 0.0 


 


CBC Comment DIFF FINAL 


 


Differential Comment  


 


Blood Urea Nitrogen 8 


 


Creatinine 0.31 


 


Random Glucose 107 


 


Calcium Level 8.7 


 


Sodium Level 136 


 


Potassium Level 3.5 


 


Chloride Level 101 


 


Carbon Dioxide Level 28.2 


 


Anion Gap 7 


 


Estimat Glomerular Filtration


Rate 208 


 


 


Lipase 2000 














 Date/Time


Source Procedure


Growth Status


 


 


 2/21/18 01:16


Urine Clean Catch Urine Culture - Final


Escherichia Coli Complete








Imaging





Last Impressions








Cholangiopancreatography MRI 2/21/18 0000 Signed





Impressions: 





 Service Date/Time:  Wednesday, February 21, 2018 12:15 - CONCLUSION:  No 





 dilation of the intra-articular hepatic biliary ducts.  No filling defects 

seen. 





  The pancreatic duct is normal in dimension..     Saeid Singh MD 


 


Abdomen/Pelvis CT 2/20/18 0000 Signed





Impressions: 





 Service Date/Time:  Tuesday, February 20, 2018 17:07 - CONCLUSION:  1. 

Abnormal 





 appearance the peripancreatic tissues with induration of the fat and poor 





 delineation of the head and body.  The findings suggest pancreatitis.  No 

focal 





 cystic areas seen and no calcifications. 2. Diffuse fatty change in the liver. 





 3. Diverticulosis of the colon without radiographic evidence of 

diverticulitis. 





 4. 3.7 cm low-density lesion in the left adnexal region of uncertain 





 significance.  This could be related to the ovary.     Saeid Singh MD 








Physical Exam





NECK: Neck is supple, no JVD,


CHEST:  Chest is clear to auscultation and percussion.


CARDIAC:  Regular rate and rhythm with no murmur gallop or rubs.


ABDOMEN:  Soft, nondistended, nontender; no hepatosplenomegaly; bowel sounds 

are present in all four quadrants.


EXTREMITIES: No clubbing, cyanosis, or edema.


SKIN:  Normal; no rash; no jaundice.


CNS:  No focal deficits; alert and oriented times three.





Assessment and Plan


Assessment:  


(1) Abnormal liver enzymes


ICD Codes:  R74.8 - Abnormal levels of other serum enzymes


(2) Alcoholic pancreatitis


ICD Codes:  K85.20 - Alcohol induced acute pancreatitis without necrosis or 

infection


(3) Hypokalemia


ICD Codes:  E87.6 - Hypokalemia


(4) Alcoholic hepatitis


ICD Codes:  K70.10 - Alcoholic hepatitis without ascites


Plan


1.  Acute on chronic alcoholic pancreatitis seems to be resolving.  In view of 

elevated  levels CA pancreas and requires further evaluation possibility 

of underlying.  Patient would need EUS on an outpatient basis


2.  Advance diet slowly as tolerated.  


3.  Check lipase and potassium levels in the a.m.


Avoid ALL ALCOHOL











Adelso Celis MD Feb 24, 2018 14:49

## 2018-02-25 VITALS
RESPIRATION RATE: 18 BRPM | HEART RATE: 79 BPM | DIASTOLIC BLOOD PRESSURE: 68 MMHG | OXYGEN SATURATION: 95 % | SYSTOLIC BLOOD PRESSURE: 125 MMHG | TEMPERATURE: 98.8 F

## 2018-02-25 VITALS
TEMPERATURE: 98.5 F | OXYGEN SATURATION: 96 % | HEART RATE: 84 BPM | DIASTOLIC BLOOD PRESSURE: 87 MMHG | RESPIRATION RATE: 20 BRPM | SYSTOLIC BLOOD PRESSURE: 142 MMHG

## 2018-02-25 VITALS
SYSTOLIC BLOOD PRESSURE: 123 MMHG | RESPIRATION RATE: 18 BRPM | DIASTOLIC BLOOD PRESSURE: 69 MMHG | OXYGEN SATURATION: 92 % | TEMPERATURE: 98.6 F | HEART RATE: 82 BPM

## 2018-02-25 VITALS
TEMPERATURE: 98.8 F | OXYGEN SATURATION: 95 % | SYSTOLIC BLOOD PRESSURE: 118 MMHG | DIASTOLIC BLOOD PRESSURE: 68 MMHG | RESPIRATION RATE: 18 BRPM | HEART RATE: 80 BPM

## 2018-02-25 VITALS
DIASTOLIC BLOOD PRESSURE: 57 MMHG | OXYGEN SATURATION: 93 % | SYSTOLIC BLOOD PRESSURE: 103 MMHG | TEMPERATURE: 96.8 F | HEART RATE: 88 BPM | RESPIRATION RATE: 20 BRPM

## 2018-02-25 LAB
BUN SERPL-MCNC: 7 MG/DL (ref 7–18)
CALCIUM SERPL-MCNC: 8.9 MG/DL (ref 8.5–10.1)
CHLORIDE SERPL-SCNC: 103 MEQ/L (ref 98–107)
CREAT SERPL-MCNC: 0.34 MG/DL (ref 0.5–1)
GFR SERPLBLD BASED ON 1.73 SQ M-ARVRAT: 187 ML/MIN (ref 89–?)
GLUCOSE SERPL-MCNC: 109 MG/DL (ref 74–106)
HCO3 BLD-SCNC: 29.7 MEQ/L (ref 21–32)
SODIUM SERPL-SCNC: 140 MEQ/L (ref 136–145)

## 2018-02-25 RX ADMIN — PANTOPRAZOLE SODIUM SCH MG: 40 INJECTION, POWDER, FOR SOLUTION INTRAVENOUS at 21:46

## 2018-02-25 RX ADMIN — Medication SCH ML: at 07:45

## 2018-02-25 RX ADMIN — Medication SCH ML: at 21:44

## 2018-02-25 RX ADMIN — PANTOPRAZOLE SODIUM SCH MG: 40 INJECTION, POWDER, FOR SOLUTION INTRAVENOUS at 09:37

## 2018-02-25 RX ADMIN — HEPARIN SODIUM SCH UNITS: 10000 INJECTION, SOLUTION INTRAVENOUS; SUBCUTANEOUS at 09:38

## 2018-02-25 RX ADMIN — HEPARIN SODIUM SCH UNITS: 10000 INJECTION, SOLUTION INTRAVENOUS; SUBCUTANEOUS at 21:44

## 2018-02-25 RX ADMIN — STANDARDIZED SENNA CONCENTRATE AND DOCUSATE SODIUM SCH TAB: 8.6; 5 TABLET, FILM COATED ORAL at 07:44

## 2018-02-25 RX ADMIN — HYDROCODONE BITARTRATE AND ACETAMINOPHEN PRN TAB: 7.5; 325 TABLET ORAL at 16:23

## 2018-02-25 RX ADMIN — STANDARDIZED SENNA CONCENTRATE AND DOCUSATE SODIUM SCH TAB: 8.6; 5 TABLET, FILM COATED ORAL at 21:45

## 2018-02-25 RX ADMIN — SODIUM CHLORIDE SCH MLS/HR: 900 INJECTION INTRAVENOUS at 09:37

## 2018-02-25 RX ADMIN — HYDROCODONE BITARTRATE AND ACETAMINOPHEN PRN TAB: 7.5; 325 TABLET ORAL at 22:20

## 2018-02-25 RX ADMIN — HYDROCODONE BITARTRATE AND ACETAMINOPHEN PRN TAB: 7.5; 325 TABLET ORAL at 07:44

## 2018-02-25 NOTE — HHI.GIFU
Subjective


Remarks


Patient was seen and examined, laying in bed comfortably, tolerating some 

liquid diet, anxious to go home, denying any abdominal pain at this time





Objective


Vitals I&O





Vital Signs








  Date Time  Temp Pulse Resp B/P (MAP) Pulse Ox O2 Delivery O2 Flow Rate FiO2


 


2/25/18 08:44   20     


 


2/25/18 08:00 98.6 82 18 123/69 (87) 92   


 


2/25/18 00:00 98.5 84 20 142/87 (105) 96   


 


2/24/18 20:00 96.0 87 20 106/70 (82) 96   


 


2/24/18 16:00 97.0 79 18 119/ 93   














I/O      


 


 2/24/18 2/24/18 2/24/18 2/25/18 2/25/18 2/25/18





 06:59 14:59 22:59 06:59 14:59 22:59


 


Intake Total 1000 ml  600 ml 240 ml  


 


Balance 1000 ml  600 ml 240 ml  


 


      


 


Intake Oral 1000 ml  600 ml 240 ml  


 


# Voids 3   2  


 


# Bowel Movements 0   0  








Laboratory





Laboratory Tests








Test


  2/25/18


06:29


 


Blood Urea Nitrogen 7 


 


Creatinine 0.34 


 


Random Glucose 109 


 


Calcium Level 8.9 


 


Sodium Level 140 


 


Potassium Level 3.2 


 


Chloride Level 103 


 


Carbon Dioxide Level 29.7 


 


Anion Gap 7 


 


Estimat Glomerular Filtration


Rate 187 


 


 


Lipase 1133 














 Date/Time


Source Procedure


Growth Status


 


 


 2/21/18 01:16


Urine Clean Catch Urine Culture - Final


Escherichia Coli Complete








Physical Exam





NECK: Neck is supple, no JVD,


CHEST:  Chest is clear to auscultation and percussion.


CARDIAC:  Regular rate and rhythm with no murmur gallop or rubs.


ABDOMEN:  Soft, nondistended, minimal tenderness no hepatosplenomegaly; bowel 

sounds are present in all four quadrants.


EXTREMITIES: No clubbing, cyanosis, or edema.


SKIN:  Normal; no rash; no jaundice.


CNS:  No focal deficits; alert and oriented times three.





Assessment and Plan


Assessment:  


(1) Abnormal liver enzymes


ICD Codes:  R74.8 - Abnormal levels of other serum enzymes


(2) Alcoholic pancreatitis


ICD Codes:  K85.20 - Alcohol induced acute pancreatitis without necrosis or 

infection


(3) Hypokalemia


ICD Codes:  E87.6 - Hypokalemia


(4) Alcoholic hepatitis


ICD Codes:  K70.10 - Alcoholic hepatitis without ascites


Plan:  Patient is 78-year-old lady with abdominal pain, pancreatitis, possibly 

alcohol-related, where try to rule out other etiologies, also elevated liver 

function test, hepatitis profile was negative,


She is feeling better but her lipase still elevated





We will check liver function tomorrow


Recheck lipase if trending down patient can be discharged


Avoid alcohol completely


Follow-up labs





Plan


1.  Acute on chronic alcoholic pancreatitis seems to be resolving.  In view of 

elevated  levels CA pancreas and requires further evaluation possibility 

of underlying.  Patient would need EUS on an outpatient basis


2.  Advance diet slowly as tolerated.  


3.  Check lipase and potassium levels in the a.m.


Avoid ALL ALCOHOL











Nathaniel Andrea MD Feb 25, 2018 12:12

## 2018-02-25 NOTE — HHI.PR
Subjective


Remarks


Follow-up acute alcoholic pancreatitis.  Patient seen and examined, all 

symptoms have resolved.  Pain is relieved.  Tolerating p.o. intake, denies 

abdominal pain, nausea or vomiting.  Denies any diarrhea.  Has been ambulating.

  Vital signs are stable.





Objective


Vitals





Vital Signs








  Date Time  Temp Pulse Resp B/P (MAP) Pulse Ox O2 Delivery O2 Flow Rate FiO2


 


2/25/18 08:44   20     


 


2/25/18 08:00 98.6 82 18 123/69 (87) 92   


 


2/25/18 00:00 98.5 84 20 142/87 (105) 96   


 


2/24/18 20:00 96.0 87 20 106/70 (82) 96   


 


2/24/18 16:00 97.0 79 18 119/ 93   














I/O      


 


 2/24/18 2/24/18 2/24/18 2/25/18 2/25/18 2/25/18





 07:00 15:00 23:00 07:00 15:00 23:00


 


Intake Total 1000 ml  600 ml 240 ml  


 


Balance 1000 ml  600 ml 240 ml  


 


      


 


Intake Oral 1000 ml  600 ml 240 ml  


 


# Voids 3   2  


 


# Bowel Movements 0   0  








Result Diagram:  


2/24/18 0726                                                                   

             2/25/18 0629





Imaging





Last Impressions








Cholangiopancreatography MRI 2/21/18 0000 Signed





Impressions: 





 Service Date/Time:  Wednesday, February 21, 2018 12:15 - CONCLUSION:  No 





 dilation of the intra-articular hepatic biliary ducts.  No filling defects 

seen. 





  The pancreatic duct is normal in dimension..     Saeid Singh MD 


 


Abdomen/Pelvis CT 2/20/18 0000 Signed





Impressions: 





 Service Date/Time:  Tuesday, February 20, 2018 17:07 - CONCLUSION:  1. 

Abnormal 





 appearance the peripancreatic tissues with induration of the fat and poor 





 delineation of the head and body.  The findings suggest pancreatitis.  No 

focal 





 cystic areas seen and no calcifications. 2. Diffuse fatty change in the liver. 





 3. Diverticulosis of the colon without radiographic evidence of 

diverticulitis. 





 4. 3.7 cm low-density lesion in the left adnexal region of uncertain 





 significance.  This could be related to the ovary.     Saeid Singh MD 








Objective Remarks


GENERAL: Well-developed, well-nourished patient in NAD.


SKIN: Warm and dry. No rash.


HEAD:  Normocephalic. Atraumatic.


EYES: Pupils equal and round. No scleral icterus. No injection or drainage. 


ENT: No nasal bleeding or discharge.  Mucous membranes pink and moist.


NECK: Supple. Trachea midline.  


CARDIOVASCULAR: Regular rate and rhythm.  S1, S2 noted. No murmur appreciated. 


RESPIRATORY: No accessory muscle use. Clear to auscultation. Breath sounds 

equal bilaterally.  


GASTROINTESTINAL: Abdomen soft. Normoactive bowel sounds x4.


MUSCULOSKELETAL: No obvious deformities. Extremities without clubbing, cyanosis

, or edema. 


NEUROLOGICAL: Awake and alert. No obvious cranial nerve deficits.  Motor 

grossly within normal limits. 5/5 muscle strength in bilateral upper and lower 

extremities.  Normal speech.


PSYCHIATRIC: Appropriate mood and affect; insight and judgment normal.





A/P


Problem List:  


(1) Acute pancreatitis


ICD Code:  K85.90 - Acute pancreatitis without necrosis or infection, 

unspecified


Status:  Acute


Assessment and Plan


Patient is a 78 female with minimal Past medical history who complains of 4 

days of nausea and severe diffuse abd pain improved by nausea and Morphine. 





Acute pancreatitis with left upper quadrant abdominal pain


   Abdominal pelvis CT reviewed showing abnormal appearance of the 

peripancreatic tissues, suggest pancreatitis.  Diffuse fatty liver.  

Diverticulitis.  3.7 cm low-density lesion in 


   the left adnexal region.


   MRCP performed showing no dilation of the hepatic biliary ducts.  No filling 

defects.  Pancreatic duct normal.  Hepatitis panel negative.  CA 19-9 antigen 

148.9 likely reactive to 


   pancreatitis


   Gastroenterology following, appreciate input recommendations.  IgG pending.  

Follow.


   CBC, essentially unremarkable.  Afebrile.


   Lipase elevated, 2000-->1133.  Follow trend.


   Supportive care.  Patient tolerating p.o. diet.  Assess for nausea vomiting 

or diarrhea.


   Control pain, Norco available as needed.


   Continue Protonix 40 IV twice daily.


   Encouraged alcohol cessation.





Urinary tract infection, urine culture growing E. coli.  Placed on Rocephin.  

Continue.





Hypokalemia, mild:  Potassium level 3.2. Replaced.





DVT prophylaxis: SCDs.


Discharge Planning


If lipase trending down tomorrow, can be discharged.





Problem Qualifiers





(1) Acute pancreatitis:  


Qualified Codes:  K85.90 - Acute pancreatitis without necrosis or infection, 

unspecified








Disha Singh Feb 25, 2018 12:31

## 2018-02-26 VITALS
OXYGEN SATURATION: 92 % | DIASTOLIC BLOOD PRESSURE: 57 MMHG | TEMPERATURE: 97.9 F | SYSTOLIC BLOOD PRESSURE: 117 MMHG | RESPIRATION RATE: 20 BRPM | HEART RATE: 86 BPM

## 2018-02-26 VITALS
SYSTOLIC BLOOD PRESSURE: 130 MMHG | OXYGEN SATURATION: 94 % | DIASTOLIC BLOOD PRESSURE: 66 MMHG | RESPIRATION RATE: 18 BRPM | HEART RATE: 88 BPM | TEMPERATURE: 99.2 F

## 2018-02-26 VITALS
OXYGEN SATURATION: 92 % | SYSTOLIC BLOOD PRESSURE: 111 MMHG | RESPIRATION RATE: 18 BRPM | TEMPERATURE: 97.6 F | HEART RATE: 86 BPM | DIASTOLIC BLOOD PRESSURE: 64 MMHG

## 2018-02-26 VITALS
RESPIRATION RATE: 16 BRPM | TEMPERATURE: 97.8 F | HEART RATE: 89 BPM | DIASTOLIC BLOOD PRESSURE: 69 MMHG | OXYGEN SATURATION: 95 % | SYSTOLIC BLOOD PRESSURE: 119 MMHG

## 2018-02-26 VITALS
DIASTOLIC BLOOD PRESSURE: 60 MMHG | HEART RATE: 83 BPM | RESPIRATION RATE: 18 BRPM | OXYGEN SATURATION: 94 % | SYSTOLIC BLOOD PRESSURE: 93 MMHG | TEMPERATURE: 96 F

## 2018-02-26 LAB
ALBUMIN SERPL-MCNC: 2.9 GM/DL (ref 3.4–5)
ALP SERPL-CCNC: 127 U/L (ref 45–117)
ALT SERPL-CCNC: 43 U/L (ref 10–53)
AST SERPL-CCNC: 109 U/L (ref 15–37)
BILIRUB INDIRECT SERPL-MCNC: 0.5 MG/DL (ref 0–0.8)
BILIRUB SERPL-MCNC: 1 MG/DL (ref 0.2–1)
BUN SERPL-MCNC: 8 MG/DL (ref 7–18)
CALCIUM SERPL-MCNC: 8.9 MG/DL (ref 8.5–10.1)
CHLORIDE SERPL-SCNC: 100 MEQ/L (ref 98–107)
CREAT SERPL-MCNC: 0.36 MG/DL (ref 0.5–1)
DIRECT BILIRUBIN ADULT: 0.5 MG/DL (ref 0–0.2)
GFR SERPLBLD BASED ON 1.73 SQ M-ARVRAT: 175 ML/MIN (ref 89–?)
GLUCOSE SERPL-MCNC: 105 MG/DL (ref 74–106)
HCO3 BLD-SCNC: 28.7 MEQ/L (ref 21–32)
PROT SERPL-MCNC: 7.2 GM/DL (ref 6.4–8.2)
SODIUM SERPL-SCNC: 137 MEQ/L (ref 136–145)

## 2018-02-26 RX ADMIN — PANCRELIPASE SCH CAP: 24000; 76000; 120000 CAPSULE, DELAYED RELEASE PELLETS ORAL at 14:41

## 2018-02-26 RX ADMIN — ONDANSETRON PRN MG: 2 INJECTION, SOLUTION INTRAMUSCULAR; INTRAVENOUS at 10:31

## 2018-02-26 RX ADMIN — PANCRELIPASE SCH CAP: 24000; 76000; 120000 CAPSULE, DELAYED RELEASE PELLETS ORAL at 09:00

## 2018-02-26 RX ADMIN — SODIUM CHLORIDE SCH MLS/HR: 900 INJECTION INTRAVENOUS at 10:32

## 2018-02-26 RX ADMIN — HYDROCODONE BITARTRATE AND ACETAMINOPHEN PRN TAB: 7.5; 325 TABLET ORAL at 10:25

## 2018-02-26 RX ADMIN — Medication SCH ML: at 14:43

## 2018-02-26 RX ADMIN — HEPARIN SODIUM SCH UNITS: 10000 INJECTION, SOLUTION INTRAVENOUS; SUBCUTANEOUS at 10:32

## 2018-02-26 RX ADMIN — PANTOPRAZOLE SODIUM SCH MG: 40 INJECTION, POWDER, FOR SOLUTION INTRAVENOUS at 22:53

## 2018-02-26 RX ADMIN — HYDROCODONE BITARTRATE AND ACETAMINOPHEN PRN TAB: 7.5; 325 TABLET ORAL at 20:11

## 2018-02-26 RX ADMIN — PANTOPRAZOLE SODIUM SCH MG: 40 INJECTION, POWDER, FOR SOLUTION INTRAVENOUS at 10:32

## 2018-02-26 RX ADMIN — HEPARIN SODIUM SCH UNITS: 10000 INJECTION, SOLUTION INTRAVENOUS; SUBCUTANEOUS at 22:52

## 2018-02-26 RX ADMIN — STANDARDIZED SENNA CONCENTRATE AND DOCUSATE SODIUM SCH TAB: 8.6; 5 TABLET, FILM COATED ORAL at 10:25

## 2018-02-26 RX ADMIN — STANDARDIZED SENNA CONCENTRATE AND DOCUSATE SODIUM SCH TAB: 8.6; 5 TABLET, FILM COATED ORAL at 22:53

## 2018-02-26 RX ADMIN — PANCRELIPASE SCH CAP: 24000; 76000; 120000 CAPSULE, DELAYED RELEASE PELLETS ORAL at 18:37

## 2018-02-26 RX ADMIN — Medication SCH ML: at 22:52

## 2018-02-26 NOTE — HHI.GIFU
GI Follow-up Note


Consult Follow-up





Subjective:  Patient laying in bed , states she has nausea, no vomiting.Not 

feeling well today.Tolerated full liquid diet.No bowel movement.Still pain 





Objective:


PHYSICAL EXAMINATION:


Vitals signs stable


No fever





Vital Signs








  Date Time  Temp Pulse Resp B/P (MAP) Pulse Ox O2 Delivery O2 Flow Rate FiO2


 


2/26/18 08:00 99.2 88 18 130/66 (87) 94   








HEENT: Pupils round and reactive to light; normocephalic; atraumatic; no 

jaundice.  Throat is clear.


NECK: Neck is supple, no JVD, no lymphadenopathy.


CHEST:  Chest is clear to auscultation and percussion.


CARDIAC:  Regular rate and rhythm with no murmur gallop or rubs.


ABDOMEN:  Soft, nondistended,epigastric tenderness tender; no hepatosplenomegaly

; bowel sounds are present in all four quadrants.


EXTREMITIES: No clubbing, cyanosis, or edema.


SKIN:  Normal; no rash; no jaundice.


CNS:  No focal deficits; alert and oriented times three.


Available Data (labs, X- Rays, Procedues) : 








 Laboratory Tests








Test


  2/25/18


06:29 2/26/18


05:20


 


Blood Urea Nitrogen 7 MG/DL  8 MG/DL 


 


Creatinine 0.34 MG/DL  0.36 MG/DL 


 


Random Glucose 109 MG/DL  105 MG/DL 


 


Calcium Level 8.9 MG/DL  8.9 MG/DL 


 


Sodium Level 140 MEQ/L  137 MEQ/L 


 


Potassium Level 3.2 MEQ/L  3.2 MEQ/L 


 


Chloride Level 103 MEQ/L  100 MEQ/L 


 


Carbon Dioxide Level 29.7 MEQ/L  28.7 MEQ/L 


 


Anion Gap 7 MEQ/L  8 MEQ/L 


 


Estimat Glomerular Filtration


Rate 187 ML/MIN 


  175 ML/MIN 


 


 


Lipase 1133 U/L  835 U/L 


 


Total Protein  7.2 GM/DL 


 


Albumin  2.9 GM/DL 


 


Alkaline Phosphatase  127 U/L 


 


Aspartate Amino Transf


(AST/SGOT) 


  109 U/L 


 


 


Alanine Aminotransferase


(ALT/SGPT) 


  43 U/L 


 


 


Total Bilirubin  1.0 MG/DL 


 


Direct Bilirubin  0.5 MG/DL 


 


Indirect Bilirubin  0.5 MG/DL 














ASSESSMENT/PLAN:


Acute on chronic pancreatitis possible ETOH induced -slowly improving


nausea secondary pancreatis 


elevated CA 19-9 possible secondary pancreatis -will need further eval op





Recommendations


HIDA scan today


consider adding  Phenergan if nausea not better


add Creon with melas


advance diet as tolerates


avoid etoh, fatty foods 





It was a pleasure seeing Vicky Martínez. Thank you for this consult.


Entered by: Cheli Granger MD Feb 26, 2018 08:18

## 2018-02-26 NOTE — RADRPT
EXAM DATE/TIME:  02/26/2018 10:29 

 

[facility] COMPARISON:     

No previous studies available for comparison.

 

INDICATIONS :     *** Abdomianl pain, nausea and vomiting for 4 days.

                   

DOSE:      4.2 mCi Tc99m Mebrofenin IV 

                   

                   

MEDICAL HISTORY :     Carcinoma, basal cell.   

SURGICAL HISTORY :      Hysterectomy.  Cholecystectomy. Inguinal hernia repair. 

ENCOUNTER:     Initial

ACUITY:     4 - 6 days

PAIN SCALE:     3/10

LOCATION:      Right upper quadrant 

 

TECHNIQUE:     Following the intravenous administration of radiotracer, dynamic sequential images wer
e performed with continuous acquisition.

 

FINDINGS:     

HEPATIC KINETICS:     There is prompt uptake of radiotracer in the liver.  No focal defects are seen.
  There is normal rate of washout from the hepatic parenchyma.

 

BILIARY CLEARANCE:     Activity is first seen in the extrahepatic biliary system at 10 minutes minute
s.  There is normal excretion into the small bowel.

 

 

BILIARY ENTRIC REFLUX:     None observed.

 

CONCLUSION:     Normal post cholecystectomy HIDA scan.  

 Rhys Trent MD FACR on February 26, 2018 at 11:35           

Board Certified Radiologist.

 This report was verified electronically.

## 2018-02-26 NOTE — HHI.DS
__________________________________________________





Discharge Summary


Admission Date


Feb 20, 2018 at 18:48


Discharge Date:  Feb 27, 2018


Admitting Diagnosis


Acute pancreatitis





(1) Acute pancreatitis


ICD Code:  K85.90 - Acute pancreatitis without necrosis or infection, 

unspecified


Status:  Acute


Procedures


HIDA scan, normal.


MRCP.


Abdominal/pelvis CT.


See results below.


Brief History - From Admission


patient is a 78 female with minimal Past medical history who complains of 4 

days of nausea and severe diffuse abd pain improved by nausea and Morphine. She 

had no fever or chills. Now hematemesis or hematochezia


She had elevated lipase and ct confirming clinical suspicion of pancreatitis 

and is therefore admitted.


CBC/BMP:  


2/24/18 0726                                                                   

             2/26/18 0520





Significant Findings





Laboratory Tests








Test


  2/23/18


10:00 2/24/18


07:26 2/25/18


06:29 2/26/18


05:20


 


Creatinine


  0.42 MG/DL


(0.50-1.00) 0.31 MG/DL


(0.50-1.00) 0.34 MG/DL


(0.50-1.00) 0.36 MG/DL


(0.50-1.00)


 


Random Glucose


  108 MG/DL


() 107 MG/DL


() 109 MG/DL


() 


 


 


Potassium Level


  2.9 MEQ/L


(3.5-5.1) 


  3.2 MEQ/L


(3.5-5.1) 3.2 MEQ/L


(3.5-5.1)


 


Lipase


  2035 U/L


() 2000 U/L


() 1133 U/L


() 835 U/L


()


 


Red Blood Count


  


  3.72 MIL/MM3


(4.00-5.30) 


  


 


 


Mean Corpuscular Hemoglobin


  


  34.2 PG


(27.0-34.0) 


  


 


 


Monocytes (%) (Auto)


  


  9.0 %


(0.0-8.0) 


  


 


 


Albumin


  


  


  


  2.9 GM/DL


(3.4-5.0)


 


Alkaline Phosphatase


  


  


  


  127 U/L


()


 


Aspartate Amino Transf


(AST/SGOT) 


  


  


  109 U/L


(15-37)


 


Direct Bilirubin


  


  


  


  0.5 MG/DL


(0.0-0.2)








Imaging





Last Impressions








Cholangiopancreatography MRI 2/21/18 0000 Signed





Impressions: 





 Service Date/Time:  Wednesday, February 21, 2018 12:15 - CONCLUSION:  No 





 dilation of the intra-articular hepatic biliary ducts.  No filling defects 

seen. 





  The pancreatic duct is normal in dimension..     Saeid Singh MD 


 


Abdomen/Pelvis CT 2/20/18 0000 Signed





Impressions: 





 Service Date/Time:  Tuesday, February 20, 2018 17:07 - CONCLUSION:  1. 

Abnormal 





 appearance the peripancreatic tissues with induration of the fat and poor 





 delineation of the head and body.  The findings suggest pancreatitis.  No 

focal 





 cystic areas seen and no calcifications. 2. Diffuse fatty change in the liver. 





 3. Diverticulosis of the colon without radiographic evidence of 

diverticulitis. 





 4. 3.7 cm low-density lesion in the left adnexal region of uncertain 





 significance.  This could be related to the ovary.     Saeid Singh MD 








PE at Discharge


GENERAL: Well-developed, well-nourished patient in NAD.


SKIN: Warm and dry. No rash.


HEAD:  Normocephalic. Atraumatic.


EYES: Pupils equal and round. No scleral icterus. No injection or drainage. 


ENT: No nasal bleeding or discharge.  Mucous membranes pink and moist.


NECK: Supple. Trachea midline.  


CARDIOVASCULAR: Regular rate and rhythm.  S1, S2 noted. No murmur appreciated. 


RESPIRATORY: No accessory muscle use. Clear to auscultation. Breath sounds 

equal bilaterally.  


GASTROINTESTINAL: Abdomen soft. Normoactive bowel sounds x4.


MUSCULOSKELETAL: No obvious deformities. Extremities without clubbing, cyanosis

, or edema. 


NEUROLOGICAL: Awake and alert. No obvious cranial nerve deficits.  Motor 

grossly within normal limits. 5/5 muscle strength in bilateral upper and lower 

extremities.  Normal speech.


PSYCHIATRIC: Appropriate mood and affect; insight and judgment normal.


Hospital Course


Patient is a 78 female with minimal Past medical history who complains of 4 

days of nausea and severe diffuse abd pain.  Patient was admitted with acute 

pancreatitis secondary to alcoholic causes.  She presented with left upper 

quadrant abdominal pain.  Abdominal pelvis CT showing abnormal appearance of 

the peripancreatic tissue suggesting pancreatitis.  Diverticulosis. MRCP 

performed showing no dilation of the hepatic biliary ducts.  No filling 

defects.  Pancreatic duct normal.  Hepatitis panel negative.  CA 19-9 antigen 

148.9 likely reactive to pancreatitis. Gastroenterology followed patient during 

hospitalization.  Lipase trended.  Pain control.  Nausea control.  PPI 

continued.  Placed on Creon. encouraged alcohol cessation.  It was also found 

the patient had a urinary tract infection growing E. coli.  Placed on Rocephin.

  Also had hypokalemia with replacement.


Pt Condition on Discharge:  Stable


Discharge Disposition:  Discharge Home


Discharge Time:  <= 30 minutes


Discharge Instructions


DIET: Follow Instructions for:  As Tolerated, No Restrictions


Speech Therapy-Diet Recommends:  Regular


Activities you can perform:  Regular-No Restrictions


Follow up Referrals:  


Gastroenterology - 10 Days with Guicho Stuart MD


PCP Follow-up - 1 Week





New Medications:  


Cefuroxime (Ceftin) 250 Mg Tab


250 MG PO BID for UTI, #10 TAB





Hydrocodone/Acetaminophen (Hydrocodone-Acetamin 7.5-325) 7.5 Mg-325 Mg Tablet


1 TAB PO Q6H PRN for pain, #20 TAB





Pancrelipase (Creon) 24,000-76,000-120,000 Units Cap


1 CAP PO TID for GI for 30 Days, #90 CAP





 


Continued Medications:  


Esomeprazole DR (Nexium 24 HR) 20 Mg Capdr


20 MG PO DAILY for Reflux, CAP

















Disha Singh Feb 26, 2018 07:52

## 2018-02-26 NOTE — HHI.PR
Subjective


Remarks


Follow-up abdominal pain secondary to alcoholic acute pancreatitis.  Patient 

seen and examined, lying in bed with complaints of continued nausea overnight.  

Status post HIDA scan which was normal.  Gastroenterology following, 

recommendations for Creon and Phenergan if needed.  Advance diet as tolerated.  

Continue to monitor lipase.





Objective


Vitals





Vital Signs








  Date Time  Temp Pulse Resp B/P (MAP) Pulse Ox O2 Delivery O2 Flow Rate FiO2


 


2/26/18 08:00 99.2 88 18 130/66 (87) 94   


 


2/26/18 00:00 97.9 86 20 117/57 (77) 92   


 


2/25/18 20:00 96.8 88 20 103/57 (72) 93   


 


2/25/18 17:23   20     


 


2/25/18 16:00 98.8 80 18 118/68 (85) 95   


 


2/25/18 12:00 98.8 79 18 125/68 (87) 95   














I/O      


 


 2/25/18 2/25/18 2/25/18 2/26/18 2/26/18 2/26/18





 06:59 14:59 22:59 06:59 14:59 22:59


 


Intake Total 240 ml  600 ml 240 ml  


 


Balance 240 ml  600 ml 240 ml  


 


      


 


Intake Oral 240 ml  600 ml 240 ml  


 


# Voids 2  3 3  


 


# Bowel Movements 0   0  








Result Diagram:  


2/24/18 0726                                                                   

             2/26/18 0520





Imaging





Last Impressions








Cholangiopancreatography MRI 2/21/18 0000 Signed





Impressions: 





 Service Date/Time:  Wednesday, February 21, 2018 12:15 - CONCLUSION:  No 





 dilation of the intra-articular hepatic biliary ducts.  No filling defects 

seen. 





  The pancreatic duct is normal in dimension..     Saeid Singh MD 


 


Abdomen/Pelvis CT 2/20/18 0000 Signed





Impressions: 





 Service Date/Time:  Tuesday, February 20, 2018 17:07 - CONCLUSION:  1. 

Abnormal 





 appearance the peripancreatic tissues with induration of the fat and poor 





 delineation of the head and body.  The findings suggest pancreatitis.  No 

focal 





 cystic areas seen and no calcifications. 2. Diffuse fatty change in the liver. 





 3. Diverticulosis of the colon without radiographic evidence of 

diverticulitis. 





 4. 3.7 cm low-density lesion in the left adnexal region of uncertain 





 significance.  This could be related to the ovary.     Saeid Singh MD 








Objective Remarks


GENERAL: Well-developed, well-nourished patient in NAD.


SKIN: Warm and dry. No rash.


HEAD:  Normocephalic. Atraumatic.


EYES: Pupils equal and round. No scleral icterus. No injection or drainage. 


ENT: No nasal bleeding or discharge.  Mucous membranes pink and moist.


NECK: Supple. Trachea midline.  


CARDIOVASCULAR: Regular rate and rhythm.  S1, S2 noted. No murmur appreciated. 


RESPIRATORY: No accessory muscle use. Clear to auscultation. Breath sounds 

equal bilaterally.  


GASTROINTESTINAL: Abdomen soft. Normoactive bowel sounds x4.  Mild tenderness 

to palpation to left upper quadrant


MUSCULOSKELETAL: No obvious deformities. Extremities without clubbing, cyanosis

, or edema. 


NEUROLOGICAL: Awake and alert. No obvious cranial nerve deficits.  Motor 

grossly within normal limits. 5/5 muscle strength in bilateral upper and lower 

extremities.  Normal speech.


PSYCHIATRIC: Appropriate mood and affect; insight and judgment normal.





A/P


Problem List:  


(1) Acute pancreatitis


ICD Code:  K85.90 - Acute pancreatitis without necrosis or infection, 

unspecified


Status:  Acute


Assessment and Plan


Patient is a 78 female with minimal Past medical history who complains of 4 

days of nausea and severe diffuse abd pain improved by nausea and Morphine. 





Acute pancreatitis with left upper quadrant abdominal pain and nausea


   Abdominal pelvis CT reviewed showing abnormal appearance of the 

peripancreatic tissues, suggest pancreatitis.  Diffuse fatty liver.  

Diverticulosis.  3.7 cm low-density lesion in 


   the left adnexal region.  Spoke to radiologist regarding lesion, appears to 

be ovarian cyst, patient is aware and this is chronic.


   MRCP performed showing no dilation of the hepatic biliary ducts.  No filling 

defects.  Pancreatic duct normal.  Hepatitis panel negative.  CA 19-9 antigen 

148.9 likely reactive to 


   pancreatitis.


   Gastroenterology following, appreciate input recommendations.  HIDA scan 

done today, normal.  IgG pending.  Follow.


   CBC, essentially unremarkable.  Afebrile.


   Lipase elevated, 2000-->1133-->835.  Still complains of nausea, 

intermittently tolerating full liquid diet.


   Supportive care.  Continue nausea overnight.  Zofran available.


   Control pain, Norco available as needed.


   Continue Protonix 40 IV twice daily.


   Encouraged alcohol cessation.





Urinary tract infection, urine culture growing E. coli.  Placed on Rocephin.  

Continue.





Hypokalemia, mild:  Potassium level 3.2. Replaced.  Continue to follow BMP.





DVT prophylaxis: SCDs.


Discharge Planning


Anticipate discharge tomorrow if lipase is trending down and patient tolerating 

p.o. intake and resolution of nausea.





Problem Qualifiers





(1) Acute pancreatitis:  


Qualified Codes:  K85.90 - Acute pancreatitis without necrosis or infection, 

unspecified








Disha Singh Feb 26, 2018 08:45

## 2018-02-27 VITALS
SYSTOLIC BLOOD PRESSURE: 99 MMHG | DIASTOLIC BLOOD PRESSURE: 81 MMHG | OXYGEN SATURATION: 92 % | TEMPERATURE: 96.7 F | HEART RATE: 84 BPM

## 2018-02-27 VITALS
SYSTOLIC BLOOD PRESSURE: 118 MMHG | OXYGEN SATURATION: 94 % | HEART RATE: 80 BPM | RESPIRATION RATE: 18 BRPM | DIASTOLIC BLOOD PRESSURE: 73 MMHG | TEMPERATURE: 98.3 F

## 2018-02-27 LAB
BUN SERPL-MCNC: 9 MG/DL (ref 7–18)
CALCIUM SERPL-MCNC: 9.2 MG/DL (ref 8.5–10.1)
CHLORIDE SERPL-SCNC: 103 MEQ/L (ref 98–107)
CREAT SERPL-MCNC: 0.32 MG/DL (ref 0.5–1)
GFR SERPLBLD BASED ON 1.73 SQ M-ARVRAT: 200 ML/MIN (ref 89–?)
GLUCOSE SERPL-MCNC: 100 MG/DL (ref 74–106)
HCO3 BLD-SCNC: 28.8 MEQ/L (ref 21–32)
SODIUM SERPL-SCNC: 139 MEQ/L (ref 136–145)

## 2018-02-27 RX ADMIN — HEPARIN SODIUM SCH UNITS: 10000 INJECTION, SOLUTION INTRAVENOUS; SUBCUTANEOUS at 09:00

## 2018-02-27 RX ADMIN — PANCRELIPASE SCH CAP: 24000; 76000; 120000 CAPSULE, DELAYED RELEASE PELLETS ORAL at 09:00

## 2018-02-27 RX ADMIN — SODIUM CHLORIDE SCH MLS/HR: 900 INJECTION INTRAVENOUS at 10:00

## 2018-02-27 RX ADMIN — STANDARDIZED SENNA CONCENTRATE AND DOCUSATE SODIUM SCH TAB: 8.6; 5 TABLET, FILM COATED ORAL at 09:00

## 2018-02-27 RX ADMIN — PANTOPRAZOLE SODIUM SCH MG: 40 INJECTION, POWDER, FOR SOLUTION INTRAVENOUS at 11:10

## 2018-02-27 RX ADMIN — HYDROCODONE BITARTRATE AND ACETAMINOPHEN PRN TAB: 7.5; 325 TABLET ORAL at 03:08

## 2018-02-27 RX ADMIN — HYDROCODONE BITARTRATE AND ACETAMINOPHEN PRN TAB: 7.5; 325 TABLET ORAL at 11:11

## 2018-02-27 RX ADMIN — Medication SCH ML: at 11:13

## 2018-06-04 ENCOUNTER — HOSPITAL ENCOUNTER (OUTPATIENT)
Dept: HOSPITAL 17 - HRAD | Age: 78
End: 2018-06-04
Attending: INTERNAL MEDICINE
Payer: MEDICARE

## 2018-06-04 DIAGNOSIS — K21.9: ICD-10-CM

## 2018-06-04 DIAGNOSIS — R13.10: Primary | ICD-10-CM

## 2018-06-04 PROCEDURE — 74230 X-RAY XM SWLNG FUNCJ C+: CPT

## 2018-06-04 PROCEDURE — 92611 MOTION FLUOROSCOPY/SWALLOW: CPT

## 2018-06-04 NOTE — RADRPT
EXAM DATE:  6/4/2018 10:49 AM EDT

AGE/SEX:        78 years / Female



INDICATIONS:  Difficulty swallowing solids and liquids sometimes. Cough.



CLINICAL DATA:  This is the patient's initial encounter. Patient reports that signs and symptoms have
 been present for 3 months and indicates a pain score of 0/10. 

                                                                          

MEDICAL/SURGICAL HISTORY:       Gastroesophageal reflux disease. None.



COMPARISON:      No prior [facility] exams available for comparison.



FLUORO TIME:  1.1



IMAGE COUNT:  0



FINDINGS:  

A modified barium swallow was performed with speech pathology. Patient was given a variety of liquids
 to swallow. Normal deglutition without aspiration.



For a full detailed report, see report by the speech pathologist.



CONCLUSION: 

Normal deglutition without aspiration. Detailed report by speech pathology.



Electronically signed by: Harshad Shelby MD  6/4/2018 11:19 AM EDT

## 2024-06-08 NOTE — PD
HPI


Chief Complaint:  Abdominal Pain


Time Seen by Provider:  16:36


Travel History


International Travel<30 days:  No


Contact w/Intl Traveler<30days:  No


Traveled to known affect area:  No





History of Present Illness


HPI


77yo F with PMH of GERD here with c/o nausea, vomiting and abdominal pain for 2 

days.  Said she has not been able to keep anything down and then started having 

abdominal pain and generalized weakness today.  Said abdominal pain is 

generalized and started after vomiting.  Denies any hematemesis.  Denies any 

fever, chest pain, sob, diarrhea, dysuria, hematuria.  Pt takes ibuprofen and 

nexium daily.  Said she has neuropathy in her feet but does not want to take 

lyrica so take ibuprofen instead.  Last colonoscopy was 2 years ago and normal.

  Normal bowel movement today.





PFSH


Past Medical History


Arthritis:  Yes


Cardiovascular Problems:  No


Diabetes:  No


Endocrine:  No


GERD:  Yes


Genitourinary:  Yes (FREQUENT UTI)


Hepatitis:  No


Hiatal Hernia:  Yes


Immune Disorder:  No


Medical other:  No


Musculoskeletal:  Yes (ARTHRITIS)


Neurologic:  No


Psychiatric:  No


Reproductive:  No


Respiratory:  No


Thyroid Disease:  No


Influenza Vaccination:  Yes


Pregnant?:  Not Pregnant





Past Surgical History


Abdominal Surgery:  Yes


AICD:  No


Body Medical Devices:  NA


Cardiac Surgery:  No


 Section:  Yes (X 3)


Cholecystectomy:  Yes


Ear Surgery:  No


Endocrine Surgery:  No


Eye Surgery:  Yes (BILATERAL CATARACT, LASER LEFT EYE LENS)


Genitourinary Surgery:  No


Gynecologic Surgery:  Yes (PARTIAL HYSTERECTOMY, 3 C SECTIONS)


Hysterectomy:  Yes


Joint Replacement:  Yes (KNEE)


Oral Surgery:  No


Pacemaker:  No


Thoracic Surgery:  No





Social History


Alcohol Use:  Yes (wine occ)


Tobacco Use:  No


Substance Use:  No





Allergies-Medications


(Allergen,Severity, Reaction):  


Coded Allergies:  


     iodine (Unverified  Adverse Reaction, Severe, FLUSH, 18)


 13  DENIES ALLERGY


     potassium iodide (Unverified  Adverse Reaction, Severe, FLUSH, 18)


 13  DENIES ALLERGY


     povidone-iodine (Unverified  Adverse Reaction, Severe, FLUSH, 18)


 13  DENIES ALLERGY


     sodium iodide (Unverified  Adverse Reaction, Severe, FLUSH, 18)


 13  DENIES ALLERGY


     sodium iodide (Unverified  Adverse Reaction, Severe, FLUSH, 18)


 13  DENIES ALLERGY


     cephalexin (Unverified  Adverse Reaction, Intermediate, NAUSEA, 18)


Reported Meds & Prescriptions





Reported Meds & Active Scripts


Active


Reported


Nexium 24 HR (Esomeprazole DR) 20 Mg Capdr 20 Mg PO DAILY








Review of Systems


Except as stated in HPI:  all other systems reviewed are Neg





Physical Exam


Narrative


GENERAL: 77yo F not in distress.


SKIN: Focused skin assessment warm/dry.


HEAD: Atraumatic. Normocephalic. 


CARDIOVASCULAR: Regular rate and rhythm.  No murmur appreciated.


RESPIRATORY: No accessory muscle use. Clear to auscultation. Breath sounds 

equal bilaterally. 


GASTROINTESTINAL: Abdomen soft, +TTP epigastric region.  +Left lower abdominal 

ttp.  No rebound tenderness or guarding.


MUSCULOSKELETAL: No obvious deformities. No clubbing.  No cyanosis.  No edema. 


NEUROLOGICAL: Awake and alert. No obvious cranial nerve deficits.  Motor 

grossly within normal limits. Normal speech.


PSYCHIATRIC: Appropriate mood and affect; insight and judgment normal.





Data


Data


Last Documented VS





Vital Signs








  Date Time  Temp Pulse Resp B/P (MAP) Pulse Ox O2 Delivery O2 Flow Rate FiO2


 


18 17:37   16     


 


18 17:30  82  140/67 (91) 97 Room Air  


 


18 16:24 97.4       








Orders





 Orders


Complete Blood Count With Diff (18 16:42)


Comprehensive Metabolic Panel (18 16:42)


Lipase (18 16:42)


Prothrombin Time / Inr (Pt) (18 16:42)


Act Partial Throm Time (Ptt) (18 16:42)


Urinalysis - C+S If Indicated (18 16:42)


Electrocardiogram (18 16:42)


Ct Abd/Pel W/O Iv Contrast (18 )


Ondansetron Inj (Zofran Inj) (18 17:00)


Morphine Inj (Morphine Inj) (18 17:00)


Sodium Chlor 0.9% 1000 Ml Inj (Ns 1000 M (18 18:30)


Morphine Inj (Morphine Inj) (18 18:30)


NPO (18 18:21)


Consult Gastroenterology (18 )


Potassium, Serum (K) (18 18:31)


(Hub Use Only)Inp Phy Cons/Ref (18 )


Admit Order (Ed Use Only) (18 18:47)





Labs





Laboratory Tests








Test


  18


17:25


 


White Blood Count 6.5 TH/MM3 


 


Red Blood Count 4.57 MIL/MM3 


 


Hemoglobin 15.1 GM/DL 


 


Hematocrit 46.9 % 


 


Mean Corpuscular Volume 102.6 FL 


 


Mean Corpuscular Hemoglobin 33.1 PG 


 


Mean Corpuscular Hemoglobin


Concent 32.2 % 


 


 


Red Cell Distribution Width 14.4 % 


 


Platelet Count 161 TH/MM3 


 


Mean Platelet Volume 8.4 FL 


 


Neutrophils (%) (Auto) 83.6 % 


 


Lymphocytes (%) (Auto) 13.1 % 


 


Monocytes (%) (Auto) 2.4 % 


 


Eosinophils (%) (Auto) 0.1 % 


 


Basophils (%) (Auto) 0.8 % 


 


Neutrophils # (Auto) 5.3 TH/MM3 


 


Lymphocytes # (Auto) 0.9 TH/MM3 


 


Monocytes # (Auto) 0.2 TH/MM3 


 


Eosinophils # (Auto) 0.0 TH/MM3 


 


Basophils # (Auto) 0.1 TH/MM3 


 


CBC Comment DIFF FINAL 


 


Differential Comment  


 


Prothrombin Time 11.8 SEC 


 


Prothromb Time International


Ratio 1.2 RATIO 


 


 


Activated Partial


Thromboplast Time 27.3 SEC 


 


 


Blood Urea Nitrogen 7 MG/DL 


 


Creatinine 0.70 MG/DL 


 


Random Glucose 102 MG/DL 


 


Total Protein 8.5 GM/DL 


 


Albumin 3.4 GM/DL 


 


Calcium Level 8.7 MG/DL 


 


Alkaline Phosphatase 199 U/L 


 


Aspartate Amino Transf


(AST/SGOT) 304 U/L 


 


 


Alanine Aminotransferase


(ALT/SGPT) 90 U/L 


 


 


Total Bilirubin 2.0 MG/DL 


 


Sodium Level 134 MEQ/L 


 


Potassium Level 5.8 MEQ/L 


 


Chloride Level 102 MEQ/L 


 


Carbon Dioxide Level 11.5 MEQ/L 


 


Anion Gap 21 MEQ/L 


 


Estimat Glomerular Filtration


Rate 81 ML/MIN 


 


 


Lipase 8640 U/L 











[facility]


Medical Decision Making


Medical Screen Exam Complete:  Yes


Emergency Medical Condition:  Yes


Interpretation(s)


EKG: NSR 76bpm.  Normal axis.  No ST segment elevation or depression.


Differential Diagnosis


Diverticulitis vs. gastroenteritis vs. pancreatitis vs. peptic ulcer disease.  

gastritis vs. UTI


Narrative Course


77yo F with abdominal pain, vomiting for 2 days.  Labs reviewed, no 

leukocytosis.  Potassium is elevated at 5.8.  Nurse informed me that the blood 

draw was against a valve and it was a slow draw.  Even though it does not say 

it is hemolyzed, pt is vomiting and hasnt kept anything down so will repeat 

potassium level and treat if it is still elevated.  No signs of hyperkalemia on 

EKG.  CO2 low at 11.5.  Pt given NS IVF.  LFTs, bilirubin elevated.   Lipase 

elevated at 8640.  UA pending.  CT a/p showed abnormal appearance of 

peripancreatic tissue with induration of the fat and poor delineation of head 

and body.  Findings suggest pancreatitis.  Pt given zofran and morphine and 

pain is better but still there.  Will give more morphine.  Pt said she drinks 

socially, a few times a week.  Said she had cholecystectomy years ago.  Had 

knee replacement last year and was on xarelto prophylactically but has not 

taken it since last year.  I discussed case with Dr. Bello and accepted to her 

service.  





I was informed by my nurse that the repeat potassium was clogged and they had 

to redraw it.   came and was finally able to redraw it.  Pt is 

currently leaving the ED and she will not be here in the ED when her repeat 

potassium comes back so Dr. Auguste paged to inform her of this and to treat 

hyperkalemia if repeat is still elevated.  I discussed with Dr. Molina Hyatt'

s PA and she will follow up on the repeat potassium level and treat accordingly.





Repeat K is 4.2.  No treatment needed.





Diagnosis





 Primary Impression:  


 Acute pancreatitis


 Qualified Codes:  K85.90 - Acute pancreatitis without necrosis or infection, 

unspecified





Admitting Information


Admitting Physician Requests:  Admit











Kristen Goff DO 2018 16:47
wound class 4